# Patient Record
Sex: FEMALE | Race: OTHER | Employment: UNEMPLOYED | URBAN - METROPOLITAN AREA
[De-identification: names, ages, dates, MRNs, and addresses within clinical notes are randomized per-mention and may not be internally consistent; named-entity substitution may affect disease eponyms.]

---

## 2024-02-19 ENCOUNTER — OFFICE VISIT (OUTPATIENT)
Dept: FAMILY MEDICINE CLINIC | Facility: CLINIC | Age: 29
End: 2024-02-19
Payer: COMMERCIAL

## 2024-02-19 VITALS
BODY MASS INDEX: 21.07 KG/M2 | RESPIRATION RATE: 16 BRPM | OXYGEN SATURATION: 100 % | HEIGHT: 62 IN | HEART RATE: 84 BPM | WEIGHT: 114.5 LBS | TEMPERATURE: 97.7 F | DIASTOLIC BLOOD PRESSURE: 64 MMHG | SYSTOLIC BLOOD PRESSURE: 110 MMHG

## 2024-02-19 DIAGNOSIS — Z86.39 HISTORY OF DIABETIC GASTROPARESIS: ICD-10-CM

## 2024-02-19 DIAGNOSIS — F31.9 BIPOLAR 1 DISORDER (HCC): ICD-10-CM

## 2024-02-19 DIAGNOSIS — F43.10 PTSD (POST-TRAUMATIC STRESS DISORDER): ICD-10-CM

## 2024-02-19 DIAGNOSIS — E10.9 CONTROLLED TYPE 1 DIABETES MELLITUS WITHOUT COMPLICATION (HCC): Primary | ICD-10-CM

## 2024-02-19 DIAGNOSIS — Z96.41 INSULIN PUMP IN PLACE: Chronic | ICD-10-CM

## 2024-02-19 DIAGNOSIS — E03.9 HYPOTHYROIDISM, UNSPECIFIED TYPE: ICD-10-CM

## 2024-02-19 DIAGNOSIS — R55 SYNCOPE, UNSPECIFIED SYNCOPE TYPE: ICD-10-CM

## 2024-02-19 DIAGNOSIS — F60.3 BORDERLINE PERSONALITY DISORDER (HCC): ICD-10-CM

## 2024-02-19 PROBLEM — F90.0 ATTENTION DEFICIT HYPERACTIVITY DISORDER (ADHD), PREDOMINANTLY INATTENTIVE TYPE: Status: ACTIVE | Noted: 2017-04-16

## 2024-02-19 PROBLEM — E55.9 VITAMIN D DEFICIENCY: Status: ACTIVE | Noted: 2017-03-20

## 2024-02-19 PROBLEM — Z82.49 FAMILY HISTORY OF PREMATURE CORONARY HEART DISEASE: Status: ACTIVE | Noted: 2019-06-02

## 2024-02-19 PROBLEM — Z86.59 HISTORY OF SUICIDAL IDEATION: Status: RESOLVED | Noted: 2019-06-02 | Resolved: 2024-02-19

## 2024-02-19 PROBLEM — G89.4 CHRONIC PAIN DISORDER: Status: ACTIVE | Noted: 2020-09-30

## 2024-02-19 PROBLEM — Z30.430 ENCOUNTER FOR IUD INSERTION: Status: ACTIVE | Noted: 2022-10-26

## 2024-02-19 PROBLEM — E78.5 DYSLIPIDEMIA: Status: ACTIVE | Noted: 2022-05-25

## 2024-02-19 PROBLEM — N93.9 ABNORMAL UTERINE BLEEDING (AUB): Status: ACTIVE | Noted: 2022-10-26

## 2024-02-19 PROBLEM — F32.9 MAJOR DEPRESSIVE DISORDER: Status: ACTIVE | Noted: 2019-03-31

## 2024-02-19 PROBLEM — R59.0 LYMPHADENOPATHY, CERVICAL: Status: ACTIVE | Noted: 2023-12-19

## 2024-02-19 PROCEDURE — 1036F TOBACCO NON-USER: CPT | Performed by: NURSE PRACTITIONER

## 2024-02-19 PROCEDURE — G8484 FLU IMMUNIZE NO ADMIN: HCPCS | Performed by: NURSE PRACTITIONER

## 2024-02-19 PROCEDURE — G8427 DOCREV CUR MEDS BY ELIG CLIN: HCPCS | Performed by: NURSE PRACTITIONER

## 2024-02-19 PROCEDURE — 99204 OFFICE O/P NEW MOD 45 MIN: CPT | Performed by: NURSE PRACTITIONER

## 2024-02-19 PROCEDURE — 2022F DILAT RTA XM EVC RTNOPTHY: CPT | Performed by: NURSE PRACTITIONER

## 2024-02-19 PROCEDURE — 3046F HEMOGLOBIN A1C LEVEL >9.0%: CPT | Performed by: NURSE PRACTITIONER

## 2024-02-19 PROCEDURE — G8420 CALC BMI NORM PARAMETERS: HCPCS | Performed by: NURSE PRACTITIONER

## 2024-02-19 RX ORDER — LEVONORGESTREL 52 MG/1
1 INTRAUTERINE DEVICE INTRAUTERINE
COMMUNITY

## 2024-02-19 RX ORDER — TRAZODONE HYDROCHLORIDE 50 MG/1
50 TABLET ORAL
COMMUNITY
Start: 2023-07-11

## 2024-02-19 RX ORDER — LAMOTRIGINE 100 MG/1
100 TABLET ORAL DAILY
COMMUNITY
Start: 2023-07-11 | End: 2024-02-19 | Stop reason: SDUPTHER

## 2024-02-19 RX ORDER — PROCHLORPERAZINE 25 MG/1
SUPPOSITORY RECTAL
COMMUNITY
Start: 2023-07-28

## 2024-02-19 RX ORDER — ATOMOXETINE 40 MG/1
40 CAPSULE ORAL DAILY
COMMUNITY
Start: 2023-07-11 | End: 2024-07-10

## 2024-02-19 RX ORDER — ROSUVASTATIN CALCIUM 5 MG/1
5 TABLET, COATED ORAL
COMMUNITY
Start: 2023-09-01

## 2024-02-19 RX ORDER — ESOMEPRAZOLE MAGNESIUM 40 MG/1
CAPSULE, DELAYED RELEASE ORAL
COMMUNITY
Start: 2023-11-28

## 2024-02-19 RX ORDER — LAMOTRIGINE 100 MG/1
100 TABLET ORAL DAILY
Qty: 30 TABLET | Refills: 0 | Status: SHIPPED | OUTPATIENT
Start: 2024-02-19

## 2024-02-19 RX ORDER — IBUPROFEN 200 MG
200 TABLET ORAL EVERY 6 HOURS PRN
COMMUNITY
Start: 2024-02-08 | End: 2024-03-09

## 2024-02-19 RX ORDER — RIZATRIPTAN BENZOATE 10 MG/1
10 TABLET ORAL
COMMUNITY
Start: 2023-08-23 | End: 2024-02-22

## 2024-02-19 RX ORDER — INSULIN DEGLUDEC INJECTION 100 U/ML
INJECTION, SOLUTION SUBCUTANEOUS
COMMUNITY
Start: 2024-01-04

## 2024-02-19 RX ORDER — LEVOTHYROXINE SODIUM 0.03 MG/1
25 TABLET ORAL DAILY
COMMUNITY
Start: 2024-01-04 | End: 2025-01-03

## 2024-02-19 RX ORDER — INSULIN ASPART 100 [IU]/ML
INJECTION, SOLUTION INTRAVENOUS; SUBCUTANEOUS
COMMUNITY
Start: 2024-01-04

## 2024-02-19 RX ORDER — PROCHLORPERAZINE 25 MG/1
SUPPOSITORY RECTAL
COMMUNITY
Start: 2023-12-18

## 2024-02-19 RX ORDER — CEFADROXIL 500 MG/1
CAPSULE ORAL
COMMUNITY
Start: 2024-02-08 | End: 2024-02-19

## 2024-02-19 RX ORDER — GLUCAGON 3 MG/1
POWDER NASAL
COMMUNITY
Start: 2021-12-16

## 2024-02-19 SDOH — ECONOMIC STABILITY: INCOME INSECURITY: HOW HARD IS IT FOR YOU TO PAY FOR THE VERY BASICS LIKE FOOD, HOUSING, MEDICAL CARE, AND HEATING?: NOT HARD AT ALL

## 2024-02-19 SDOH — ECONOMIC STABILITY: HOUSING INSECURITY
IN THE LAST 12 MONTHS, WAS THERE A TIME WHEN YOU DID NOT HAVE A STEADY PLACE TO SLEEP OR SLEPT IN A SHELTER (INCLUDING NOW)?: NO

## 2024-02-19 SDOH — ECONOMIC STABILITY: FOOD INSECURITY: WITHIN THE PAST 12 MONTHS, THE FOOD YOU BOUGHT JUST DIDN'T LAST AND YOU DIDN'T HAVE MONEY TO GET MORE.: NEVER TRUE

## 2024-02-19 SDOH — ECONOMIC STABILITY: FOOD INSECURITY: WITHIN THE PAST 12 MONTHS, YOU WORRIED THAT YOUR FOOD WOULD RUN OUT BEFORE YOU GOT MONEY TO BUY MORE.: NEVER TRUE

## 2024-02-19 ASSESSMENT — PATIENT HEALTH QUESTIONNAIRE - PHQ9
SUM OF ALL RESPONSES TO PHQ9 QUESTIONS 1 & 2: 0
SUM OF ALL RESPONSES TO PHQ QUESTIONS 1-9: 0
SUM OF ALL RESPONSES TO PHQ QUESTIONS 1-9: 0
2. FEELING DOWN, DEPRESSED OR HOPELESS: 0
1. LITTLE INTEREST OR PLEASURE IN DOING THINGS: 0
SUM OF ALL RESPONSES TO PHQ QUESTIONS 1-9: 0
SUM OF ALL RESPONSES TO PHQ QUESTIONS 1-9: 0

## 2024-02-19 NOTE — PROGRESS NOTES
Negin Pham (: 1995) presents today to establish care and for other conditions. Was seeing giancarlo and now needs to transfer due to insurance issues. Was seeing cardiology, rheumatology, endocrinology, neurology, psychiatry, and counselor. Has seen hand specialist in the past for carpel tunnel but does not need referral at this time for them.       She has history of borderline personality disorder, type 1 diabetes, gastroparesis, migraines, joint pain, and syncope.    Psychiatry- treating borderline personality, PTSD, bipolar 2 disorder. On straterra 40 mg-ADHD, Lamictal 100 mg (needs refills of this today, 50 mg trazodone for insomnia.    Cardiology/neurology- during pregnancy started passing out. Has had bradycardia, tachycardia. Takes maxalt for migraine treatment. Having more than 12 migraines a month.     Type 1 diabetes, thyroid disorder- well controlled now according to patient. Dx at age 5. Has DEXCOM 6 and insulin pump. On 40 mg nexium for gastroparesis- had gastric emptying study done in the past. On novolag, tresiba. On 25 mcg synthroid.     Cholesterol- been elevated since 2017- on crestor 5 mg.     Rheumatology- joint pain since she was 12 years old. Has never been diagnosed with anything in the past. Found a visit from - they thought possibly fibromyalgia and recommended naproxen. Arthritic panel appeared negative at that time.     Chief Complaint   Patient presents with    New Patient     Establish care and discuss referrals and health issues      Patient Active Problem List   Diagnosis    Vitamin D deficiency    Vitamin B12 deficiency    Syncope    Lymphocytic thyroiditis    Lymphadenopathy, cervical    Iron deficiency anemia    Insulin pump in place    Hypothyroidism    Family history of premature coronary heart disease    Encounter for IUD insertion    Dyslipidemia    Major depressive disorder

## 2024-02-20 PROBLEM — F31.9 BIPOLAR 1 DISORDER (HCC): Status: ACTIVE | Noted: 2024-02-20

## 2024-02-20 PROBLEM — F43.10 PTSD (POST-TRAUMATIC STRESS DISORDER): Status: ACTIVE | Noted: 2024-02-20

## 2024-02-22 ENCOUNTER — OFFICE VISIT (OUTPATIENT)
Dept: NEUROLOGY | Age: 29
End: 2024-02-22

## 2024-02-22 VITALS
WEIGHT: 113.6 LBS | BODY MASS INDEX: 20.91 KG/M2 | HEIGHT: 62 IN | OXYGEN SATURATION: 98 % | SYSTOLIC BLOOD PRESSURE: 122 MMHG | HEART RATE: 105 BPM | DIASTOLIC BLOOD PRESSURE: 89 MMHG

## 2024-02-22 DIAGNOSIS — G90.9 AUTONOMIC DYSFUNCTION: ICD-10-CM

## 2024-02-22 DIAGNOSIS — G43.109 MIGRAINE WITH AURA AND WITHOUT STATUS MIGRAINOSUS, NOT INTRACTABLE: ICD-10-CM

## 2024-02-22 DIAGNOSIS — E10.9 CONTROLLED TYPE 1 DIABETES MELLITUS WITHOUT COMPLICATION (HCC): ICD-10-CM

## 2024-02-22 DIAGNOSIS — R55 SYNCOPE AND COLLAPSE: Primary | ICD-10-CM

## 2024-02-22 RX ORDER — UBROGEPANT 100 MG/1
100 TABLET ORAL DAILY PRN
Qty: 16 TABLET | Refills: 0 | Status: SHIPPED | OUTPATIENT
Start: 2024-02-22

## 2024-02-22 ASSESSMENT — ENCOUNTER SYMPTOMS
EYE PAIN: 0
PHOTOPHOBIA: 1
RESPIRATORY NEGATIVE: 1
NAUSEA: 1

## 2024-02-22 NOTE — PATIENT INSTRUCTIONS
Headache Education:   Instructed the patient on over-the-counter headache management medications including: CoQ10, magnesium oxide, and butterbur.  To avoid a pain medication overuse headache trying not to take pain medicines more than 3 doses a week.   Avoid use of Fioricet or opioids to treat headaches as this can increase risk for rebound headaches.   To help relieve headache symptoms without taking pain medicine lie down under darkroom and drink glass of water.  Consider lifestyle modification including good sleep hygiene, routine medial schedules, regular exercise and managing triggers.  Keep a headache diary  to reveal triggers and possible patterns.  Triggers may be: Food, stress, perfumes, alcohol, or even chocolate.  Drink plenty of water and try to get 8 hours of sleep each night to reduce risk factors that may cause headaches.      Samples Ubrelvy 100 mg provided to patient. Advised to update office on efficacy.   Instructions:  Qulipta 60 mg daily for migraine prevention.   Ubrelvy 100 mg daily as needed for migraine abortive therapy. May repeat for 1 dose in 2 hours if needed. Not to exceed 200mg/24 hours.

## 2024-02-22 NOTE — PROGRESS NOTES
reflexes present. Sensation is intact to light touch, pinprick, vibration and proprioception in all extremities. Cerebellar examination is normal. Gait and stance are normal.     Most recent EEG  - I personally reviewed this image   EEG REPORT     Date of service: 4/18/23     Centrally-acting medications: Strattera     Indication: syncope     Technique: This EEG was performed on a 28-year-old patient with digital equipment utilizing 26 active electrodes placed according to the international 10-20 system. The study was recorded digitally with a bandpass of 1-70 Hz and a sampling rate of 200 Hz and was reviewed with the possibility of multiple reformatting.  An EKG was monitored. The length of the recording was 30 minutes.     The patient was noted to be awake, drowsy and asleep throughout the recording. During maximal wakefulness, a posterior dominant background rhythm of 10 Hz was present which was well modulated, symmetrical, reactive to eye opening and of moderate voltage. There were faster frequencies present in the bilateral anterior head regions. Drowsiness was present with attenuation of the posterior dominant background rhythm and vertex waves. Stage N2 sleep was present with symmetrical sleep spindles, K complexes and vertex waves.     Photic stimulation was performed which induced a symmetrical posterior driving response at various flash frequencies. Hyperventilation was performed and produced a mild amount of diffuse slowing.     Single lead EKG revealed a normal sinus rhythm.     Impression:   Normal awake and asleep EEG.     Clinical interpretation:   No epileptiform activity or focal regions of cerebral dysfunction were present. A normal EEG does not rule out epilepsy, clinical correlation is recommended.       Most recent MRI - I personally reviewed this image   MRI BRAIN WITHOUT CONTRAST     INDICATION: 28-year-old with headache and syncope.     COMPARISON:     TECHNIQUE: Multiplanar, multisequence MR

## 2024-02-26 ENCOUNTER — TELEPHONE (OUTPATIENT)
Dept: NEUROLOGY | Age: 29
End: 2024-02-26

## 2024-02-26 NOTE — TELEPHONE ENCOUNTER
Approved for Ubrelvy 100mg from 2/26/24 yo 2/25/25. PA code # [id number]    Approved for Qulipta 60mg from 2/26/2024 to 2/25/2025  PA # [id number]

## 2024-03-05 ENCOUNTER — PROCEDURE VISIT (OUTPATIENT)
Dept: NEUROLOGY | Age: 29
End: 2024-03-05
Payer: COMMERCIAL

## 2024-03-05 VITALS — HEIGHT: 62 IN | BODY MASS INDEX: 20.78 KG/M2 | OXYGEN SATURATION: 99 % | HEART RATE: 105 BPM

## 2024-03-05 DIAGNOSIS — R20.2 PARESTHESIA OF BOTH FEET: Primary | ICD-10-CM

## 2024-03-05 PROCEDURE — 95885 MUSC TST DONE W/NERV TST LIM: CPT | Performed by: PSYCHIATRY & NEUROLOGY

## 2024-03-05 PROCEDURE — 95911 NRV CNDJ TEST 9-10 STUDIES: CPT | Performed by: PSYCHIATRY & NEUROLOGY

## 2024-03-05 NOTE — PROGRESS NOTES
EMG/Nerve Conduction Study Procedure Note  [address]    [address]  [place]  [address]   [phone]      Hx:    Exam:     28 y.o.  M W  female right-handed accompanied by her infant/2-year-old.  DM type I diabetic.  Paresthesia of feet.  BLE.  EMG.  Referring: Erlinda Parikh CNP  PCP Di Jones CNP  Technologist: Dc Lee  Height: 5 foot 2 inch        Summary               selected muscles tested bilateral lower extremities EMG with CV.       Controlled environmental factors / EMG lab.  Temperature.   NCV : sensory segments:    Normal bilateral sural SCV.  NCV plantar sensory segments:     Normal bilateral plantar SCV.  NCV Motor MCV segments:     Normal bilateral peroneal and tibial MCV.  F-wave studies:         Normal bilateral peroneal bilateral tibial F waves.  H-REFLEX Studies:    None normal bilateral H-reflexes.   NEEDLE EMG:   Tested muscles::   Normal bilateral TA MG VL muscles lower extremities =  Normal insertional activity and interference pattern/recruitment.  No fasciculations fibrillations positive sharp waves.  Normal MUP.  No BSS AP.  No giant MUP.  No myotonia.  No upper motor neuron sign.          INTERPRETATION:             INTERPRETATION:       NORMAL STUDIES.     THERE IS NO ELECTROPHYSIOLOGIC EVIDENCE OF NEUROPATHY, DENERVATION, RADICULOPATHY, PLEXOPATHY, MYOPATHY, MYOTONIA, OR FASCICULATIONS IN THE TESTED SEGMENTS  OF  BOTH LOWER    EXTREMITIES.            CONCLUSION:      NORMAL BILATERAL LOWER STUDIES.        Procedure Details:       FURTHER clinical correlation is recommended and warranted as studies are within normal limits.                                                             Patient is made aware.                    Please Note::     Data and waveforms * filed under Procedure category [facility].    See Procedure Files for complete data pages.       [ *NOTE:  parts or all of this consultation are produced using artificial voice recognition software.  Some

## 2024-03-08 ENCOUNTER — PROCEDURE VISIT (OUTPATIENT)
Dept: NEUROLOGY | Age: 29
End: 2024-03-08
Payer: COMMERCIAL

## 2024-03-08 DIAGNOSIS — R55 SYNCOPE, UNSPECIFIED SYNCOPE TYPE: Primary | ICD-10-CM

## 2024-03-08 PROCEDURE — 95921 AUTONOMIC NRV PARASYM INERVJ: CPT | Performed by: PSYCHIATRY & NEUROLOGY

## 2024-03-08 NOTE — PROGRESS NOTES
Negin Pham  :  1995  DOS:  3/8/2024             AUTONOMIC TESTING::  Syncopal episodes.  Loss of consciousness/fainting.  Referring:   Erlinda CAICEDO       R-R INTERVAL::       Ratio         R-R Melinda:         HR  Variance:               RESTING::      1.53         10.4     10.7       6 BREATHS =      7.53         39.4      48.1       SUPINE =    BP =    148/94         HR =    66       SITTING =   BP =     125/95       HR =      69   STANDING =  BP =   114/93 *     HR =      91*      SYMPATHETIC SKIN RESPONSE::    PALM:   0.61         FOOT:     0.78                 CONCLUSION::           A significant drop in the blood pressures is detected on standing versus supine = thus orthostatic hypotension is present.  However, basic autonomic pathways by testing are normal.        [ Parts of this report may have been dictated using artificial dictation with its inherent possibilities of errors.]              Gabino Rdz MD  Consultative Neurology, [place] [address]  Phone:  [phone]  Fax:   [phone]

## 2024-03-12 ENCOUNTER — OFFICE VISIT (OUTPATIENT)
Age: 29
End: 2024-03-12
Payer: COMMERCIAL

## 2024-03-12 VITALS
BODY MASS INDEX: 20.28 KG/M2 | HEART RATE: 94 BPM | RESPIRATION RATE: 16 BRPM | OXYGEN SATURATION: 99 % | HEIGHT: 62 IN | SYSTOLIC BLOOD PRESSURE: 110 MMHG | WEIGHT: 110.2 LBS | DIASTOLIC BLOOD PRESSURE: 70 MMHG

## 2024-03-12 DIAGNOSIS — R19.5 MUCUS IN STOOL: ICD-10-CM

## 2024-03-12 DIAGNOSIS — R10.13 EPIGASTRIC PAIN: ICD-10-CM

## 2024-03-12 DIAGNOSIS — K30 DELAYED GASTRIC EMPTYING: Primary | ICD-10-CM

## 2024-03-12 DIAGNOSIS — R19.4 BOWEL HABIT CHANGES: ICD-10-CM

## 2024-03-12 DIAGNOSIS — R11.0 CHRONIC NAUSEA: ICD-10-CM

## 2024-03-12 PROCEDURE — G8484 FLU IMMUNIZE NO ADMIN: HCPCS | Performed by: PHYSICIAN ASSISTANT

## 2024-03-12 PROCEDURE — G8420 CALC BMI NORM PARAMETERS: HCPCS | Performed by: PHYSICIAN ASSISTANT

## 2024-03-12 PROCEDURE — G8427 DOCREV CUR MEDS BY ELIG CLIN: HCPCS | Performed by: PHYSICIAN ASSISTANT

## 2024-03-12 PROCEDURE — 99204 OFFICE O/P NEW MOD 45 MIN: CPT | Performed by: PHYSICIAN ASSISTANT

## 2024-03-12 PROCEDURE — 1036F TOBACCO NON-USER: CPT | Performed by: PHYSICIAN ASSISTANT

## 2024-03-12 RX ORDER — ONDANSETRON 4 MG/1
4 TABLET, FILM COATED ORAL DAILY PRN
Qty: 30 TABLET | Refills: 1 | Status: SHIPPED | OUTPATIENT
Start: 2024-03-12

## 2024-03-12 NOTE — PATIENT INSTRUCTIONS
For reflux:   Eat smaller and more frequent meals. Avoid lying down for 3 hours after eating. Elevate the head of the bed by 6 inches. Avoid wearing tight-fitting clothing. Stop smoking and avoid alcohol. Avoid NSAID medications (Aspirin, Excedrin, Aleve, Ibuprofen, Goody Powder, Toradol, Mobic, Voltaren, etc). Consider weight loss to get to a healthy weight, which is often critical to eliminating symptoms of reflux. Avoid foods and acid-containing beverages that can exacerbate the symptoms of reflux. This includes:     -Caffeine  -Chocolate  -Peppermint  -Alcohol (especially red wine)  -Carbonated beverages  -Citrus fruits  -Tomato-based products  -Vinegar  -Spicy and greasy foods

## 2024-03-13 ENCOUNTER — PREP FOR PROCEDURE (OUTPATIENT)
Dept: GASTROENTEROLOGY | Age: 29
End: 2024-03-13

## 2024-03-13 DIAGNOSIS — K30 DELAYED GASTRIC EMPTYING: ICD-10-CM

## 2024-03-13 DIAGNOSIS — R19.4 BOWEL HABIT CHANGES: ICD-10-CM

## 2024-03-13 DIAGNOSIS — R10.13 EPIGASTRIC PAIN: ICD-10-CM

## 2024-03-13 DIAGNOSIS — R11.0 CHRONIC NAUSEA: ICD-10-CM

## 2024-03-13 DIAGNOSIS — R19.5 MUCUS IN STOOL: ICD-10-CM

## 2024-03-13 RX ORDER — SODIUM CHLORIDE 0.9 % (FLUSH) 0.9 %
5-40 SYRINGE (ML) INJECTION EVERY 12 HOURS SCHEDULED
Status: CANCELLED | OUTPATIENT
Start: 2024-03-13

## 2024-03-13 RX ORDER — SODIUM CHLORIDE 9 MG/ML
25 INJECTION, SOLUTION INTRAVENOUS PRN
Status: CANCELLED | OUTPATIENT
Start: 2024-03-13

## 2024-03-13 RX ORDER — SODIUM CHLORIDE 0.9 % (FLUSH) 0.9 %
5-40 SYRINGE (ML) INJECTION PRN
Status: CANCELLED | OUTPATIENT
Start: 2024-03-13

## 2024-03-14 DIAGNOSIS — G43.109 MIGRAINE WITH AURA AND WITHOUT STATUS MIGRAINOSUS, NOT INTRACTABLE: ICD-10-CM

## 2024-03-14 RX ORDER — UBROGEPANT 100 MG/1
TABLET ORAL
Qty: 16 TABLET | Refills: 0 | OUTPATIENT
Start: 2024-03-14

## 2024-03-28 ENCOUNTER — INITIAL CONSULT (OUTPATIENT)
Age: 29
End: 2024-03-28
Payer: COMMERCIAL

## 2024-03-28 VITALS
WEIGHT: 108.2 LBS | SYSTOLIC BLOOD PRESSURE: 120 MMHG | DIASTOLIC BLOOD PRESSURE: 78 MMHG | HEIGHT: 62 IN | HEART RATE: 84 BPM | BODY MASS INDEX: 19.91 KG/M2

## 2024-03-28 DIAGNOSIS — Z82.49 FAMILY HISTORY OF CARDIOMYOPATHY: ICD-10-CM

## 2024-03-28 DIAGNOSIS — R00.0 TACHYCARDIA: ICD-10-CM

## 2024-03-28 DIAGNOSIS — E78.2 MIXED HYPERLIPIDEMIA: ICD-10-CM

## 2024-03-28 DIAGNOSIS — R07.2 PRECORDIAL CHEST PAIN: ICD-10-CM

## 2024-03-28 DIAGNOSIS — Z87.898 HISTORY OF SYNCOPE: ICD-10-CM

## 2024-03-28 DIAGNOSIS — I95.1 ORTHOSTATIC HYPOTENSION: Primary | ICD-10-CM

## 2024-03-28 PROCEDURE — 99204 OFFICE O/P NEW MOD 45 MIN: CPT | Performed by: INTERNAL MEDICINE

## 2024-03-28 PROCEDURE — 93000 ELECTROCARDIOGRAM COMPLETE: CPT | Performed by: INTERNAL MEDICINE

## 2024-03-28 PROCEDURE — G8420 CALC BMI NORM PARAMETERS: HCPCS | Performed by: INTERNAL MEDICINE

## 2024-03-28 PROCEDURE — G8427 DOCREV CUR MEDS BY ELIG CLIN: HCPCS | Performed by: INTERNAL MEDICINE

## 2024-03-28 PROCEDURE — G8484 FLU IMMUNIZE NO ADMIN: HCPCS | Performed by: INTERNAL MEDICINE

## 2024-03-28 ASSESSMENT — ENCOUNTER SYMPTOMS
HEMOPTYSIS: 0
HEMATOCHEZIA: 0
DOUBLE VISION: 0
ABDOMINAL PAIN: 0
HOARSE VOICE: 0
HEMATEMESIS: 0
WHEEZING: 0
STRIDOR: 0
EYE REDNESS: 0

## 2024-03-28 NOTE — PROGRESS NOTES
[facility] CARDIOLOGY  [address] [place] [address], [address]  [place] [address]  PHONE: [phone]          24    NAME:  Negin Pham  : 1995  MRN: [id number]         SUBJECTIVE:   Negin Pham is a 29 y.o. female seen for a visit regarding the following:     Chief Complaint   Patient presents with   • Consultation     History of syncope  Orthostatic hypotension  Mixed hyperlipidemia  Family history of premature coronary heart disease               HPI:    1.  Syncope/orthostatic hypotension  Holter 2023-48-hour-sinus rhythm-average heart rate 79 bpm, rare PACs and PVCs  Echo from 20222724-Oxsqjl-VH 55 to 60%, otherwise normal  2.  Family history of coronary artery disease/cardiomyopathy-father  at the age of 28  Treadmill exercise stress test 2022-no ischemia-hit stage IV-achieved 12.5 METS  3.  Mixed hyperlipidemia  4.  Type 1 diabetes- diagnosed at 5  5.  Chest pain    Dear Di,  I saw Ms. Pham who is a pleasant 29-year-old woman in cardiovascular consultation for recurrent syncope with orthostatic hypotension, family history of coronary artery disease with known underlying hyperlipidemia and type 1 diabetes mellitus.    Syncope/orthostatic hypotension-has dealt with this for a while.  Has had multiple episodes of passing out completely but almost always happens when she stands from a seated position.  She has been wearing compression stockings, has been liberalizing salt and fluid intake which has helped lately.  Has somewhat of neuropathy associated with her type 1 diabetes that she has had for over 20 years.  No associated palpitations although at times she has some of it.  It is not always associated with syncope.    Tachycardia/bradycardia-quite often has episodes of heart rate reaching over 200 bpm.  She says after that it drops quite often only into the 40s and she gets lightheaded and dizzy.  She has been on Holter monitor but a very short 1 last year for just 48

## 2024-04-01 ENCOUNTER — OFFICE VISIT (OUTPATIENT)
Dept: FAMILY MEDICINE CLINIC | Facility: CLINIC | Age: 29
End: 2024-04-01
Payer: COMMERCIAL

## 2024-04-01 VITALS
RESPIRATION RATE: 16 BRPM | BODY MASS INDEX: 19.54 KG/M2 | OXYGEN SATURATION: 98 % | SYSTOLIC BLOOD PRESSURE: 90 MMHG | TEMPERATURE: 98.4 F | WEIGHT: 106.2 LBS | HEIGHT: 62 IN | HEART RATE: 85 BPM | DIASTOLIC BLOOD PRESSURE: 60 MMHG

## 2024-04-01 DIAGNOSIS — Z86.19 HISTORY OF HPV INFECTION: ICD-10-CM

## 2024-04-01 DIAGNOSIS — Z00.00 ROUTINE GENERAL MEDICAL EXAMINATION AT A HEALTH CARE FACILITY: Primary | ICD-10-CM

## 2024-04-01 DIAGNOSIS — F60.3 BORDERLINE PERSONALITY DISORDER (HCC): ICD-10-CM

## 2024-04-01 DIAGNOSIS — Z00.00 ROUTINE GENERAL MEDICAL EXAMINATION AT A HEALTH CARE FACILITY: ICD-10-CM

## 2024-04-01 DIAGNOSIS — Z12.4 SCREENING FOR CERVICAL CANCER: ICD-10-CM

## 2024-04-01 DIAGNOSIS — N93.9 ABNORMAL UTERINE BLEEDING (AUB): ICD-10-CM

## 2024-04-01 LAB
ALBUMIN SERPL-MCNC: 3.7 G/DL (ref 3.5–5)
ALBUMIN/GLOB SERPL: 1 (ref 0.4–1.6)
ALP SERPL-CCNC: 64 U/L (ref 50–136)
ALT SERPL-CCNC: 16 U/L (ref 12–65)
ANION GAP SERPL CALC-SCNC: 4 MMOL/L (ref 2–11)
AST SERPL-CCNC: 17 U/L (ref 15–37)
BASOPHILS # BLD: 0.1 K/UL (ref 0–0.2)
BASOPHILS NFR BLD: 1 % (ref 0–2)
BILIRUB SERPL-MCNC: 0.5 MG/DL (ref 0.2–1.1)
BUN SERPL-MCNC: 9 MG/DL (ref 6–23)
CALCIUM SERPL-MCNC: 9.5 MG/DL (ref 8.3–10.4)
CHLORIDE SERPL-SCNC: 107 MMOL/L (ref 103–113)
CHOLEST SERPL-MCNC: 136 MG/DL
CO2 SERPL-SCNC: 24 MMOL/L (ref 21–32)
CREAT SERPL-MCNC: 0.9 MG/DL (ref 0.6–1)
DIFFERENTIAL METHOD BLD: ABNORMAL
EOSINOPHIL # BLD: 0.1 K/UL (ref 0–0.8)
EOSINOPHIL NFR BLD: 1 % (ref 0.5–7.8)
ERYTHROCYTE [DISTWIDTH] IN BLOOD BY AUTOMATED COUNT: 12.8 % (ref 11.9–14.6)
GLOBULIN SER CALC-MCNC: 3.7 G/DL (ref 2.8–4.5)
GLUCOSE SERPL-MCNC: 297 MG/DL (ref 65–100)
HCT VFR BLD AUTO: 41.4 % (ref 35.8–46.3)
HDLC SERPL-MCNC: 46 MG/DL (ref 40–60)
HDLC SERPL: 3
HGB BLD-MCNC: 13.1 G/DL (ref 11.7–15.4)
IMM GRANULOCYTES # BLD AUTO: 0 K/UL (ref 0–0.5)
IMM GRANULOCYTES NFR BLD AUTO: 0 % (ref 0–5)
LDLC SERPL CALC-MCNC: 79.6 MG/DL
LYMPHOCYTES # BLD: 2.3 K/UL (ref 0.5–4.6)
LYMPHOCYTES NFR BLD: 29 % (ref 13–44)
MCH RBC QN AUTO: 28.5 PG (ref 26.1–32.9)
MCHC RBC AUTO-ENTMCNC: 31.6 G/DL (ref 31.4–35)
MCV RBC AUTO: 90 FL (ref 82–102)
MONOCYTES # BLD: 0.5 K/UL (ref 0.1–1.3)
MONOCYTES NFR BLD: 7 % (ref 4–12)
NEUTS SEG # BLD: 4.8 K/UL (ref 1.7–8.2)
NEUTS SEG NFR BLD: 62 % (ref 43–78)
NRBC # BLD: 0 K/UL (ref 0–0.2)
PLATELET # BLD AUTO: 504 K/UL (ref 150–450)
PMV BLD AUTO: 10.6 FL (ref 9.4–12.3)
POTASSIUM SERPL-SCNC: 4.8 MMOL/L (ref 3.5–5.1)
PROT SERPL-MCNC: 7.4 G/DL (ref 6.3–8.2)
RBC # BLD AUTO: 4.6 M/UL (ref 4.05–5.2)
SODIUM SERPL-SCNC: 135 MMOL/L (ref 136–146)
TRIGL SERPL-MCNC: 52 MG/DL (ref 35–150)
TSH, 3RD GENERATION: 3.39 UIU/ML (ref 0.36–3.74)
VLDLC SERPL CALC-MCNC: 10.4 MG/DL (ref 6–23)
WBC # BLD AUTO: 7.7 K/UL (ref 4.3–11.1)

## 2024-04-01 PROCEDURE — G8420 CALC BMI NORM PARAMETERS: HCPCS | Performed by: NURSE PRACTITIONER

## 2024-04-01 PROCEDURE — 1036F TOBACCO NON-USER: CPT | Performed by: NURSE PRACTITIONER

## 2024-04-01 PROCEDURE — 99213 OFFICE O/P EST LOW 20 MIN: CPT | Performed by: NURSE PRACTITIONER

## 2024-04-01 PROCEDURE — 99395 PREV VISIT EST AGE 18-39: CPT | Performed by: NURSE PRACTITIONER

## 2024-04-01 PROCEDURE — G8427 DOCREV CUR MEDS BY ELIG CLIN: HCPCS | Performed by: NURSE PRACTITIONER

## 2024-04-01 SDOH — ECONOMIC STABILITY: FOOD INSECURITY: WITHIN THE PAST 12 MONTHS, THE FOOD YOU BOUGHT JUST DIDN'T LAST AND YOU DIDN'T HAVE MONEY TO GET MORE.: NEVER TRUE

## 2024-04-01 SDOH — ECONOMIC STABILITY: FOOD INSECURITY: WITHIN THE PAST 12 MONTHS, YOU WORRIED THAT YOUR FOOD WOULD RUN OUT BEFORE YOU GOT MONEY TO BUY MORE.: NEVER TRUE

## 2024-04-01 SDOH — ECONOMIC STABILITY: INCOME INSECURITY: HOW HARD IS IT FOR YOU TO PAY FOR THE VERY BASICS LIKE FOOD, HOUSING, MEDICAL CARE, AND HEATING?: NOT HARD AT ALL

## 2024-04-01 ASSESSMENT — PATIENT HEALTH QUESTIONNAIRE - PHQ9
SUM OF ALL RESPONSES TO PHQ9 QUESTIONS 1 & 2: 0
2. FEELING DOWN, DEPRESSED OR HOPELESS: NOT AT ALL
SUM OF ALL RESPONSES TO PHQ QUESTIONS 1-9: 0
1. LITTLE INTEREST OR PLEASURE IN DOING THINGS: NOT AT ALL

## 2024-04-01 NOTE — PROGRESS NOTES
Negin Pham (: 1995) presents today for physical and follow up.     She has history of borderline personality disorder, type 1 diabetes, gastroparesis, migraines, joint pain, and syncope.     Seeing GI, cardiology, neurology, and endocrinology. Still waiting on apt with psychiatry.     Cervical cancer screening- she is unsure of LMP due to having IUD mirena- does not have regular cycles with this. Long history of heavy cycles and irregular cycles. She has history of HPV as well. Would like to see gynecology to establish care.     Chief Complaint   Patient presents with    Annual Exam     Physical and pap     Patient Active Problem List   Diagnosis    Vitamin D deficiency    Vitamin B12 deficiency    Syncope    Lymphocytic thyroiditis    Lymphadenopathy, cervical    Iron deficiency anemia    Insulin pump in place    Hypothyroidism    Family history of premature coronary heart disease    Encounter for IUD insertion    Dyslipidemia    Major depressive disorder    Depression with anxiety    Controlled type 1 diabetes mellitus without complication (HCC)    Chronic pain disorder    Borderline personality disorder (HCC)    Attention deficit hyperactivity disorder (ADHD), predominantly inattentive type    Abnormal uterine bleeding (AUB)    PTSD (post-traumatic stress disorder)    Bipolar 2 disorder (HCC)    Delayed gastric emptying    Epigastric pain    Chronic nausea    Bowel habit changes    Mucus in stool     Past Medical History:   Diagnosis Date    ADHD (attention deficit hyperactivity disorder), inattentive type     Bipolar 2 disorder (HCC)     Borderline personality disorder (HCC)     Chronic pain syndrome     Diabetes (HCC)     High cholesterol     Hypothyroid     Lymphadenopathy     Orthostatic hypotension     PTSD (post-traumatic stress disorder)     Syncope     unspecified     Past Surgical History:   Procedure Laterality

## 2024-04-11 ENCOUNTER — OFFICE VISIT (OUTPATIENT)
Dept: ENDOCRINOLOGY | Age: 29
End: 2024-04-11
Payer: COMMERCIAL

## 2024-04-11 VITALS
HEART RATE: 74 BPM | DIASTOLIC BLOOD PRESSURE: 60 MMHG | BODY MASS INDEX: 19.39 KG/M2 | WEIGHT: 106 LBS | SYSTOLIC BLOOD PRESSURE: 90 MMHG

## 2024-04-11 DIAGNOSIS — Z96.41 INSULIN PUMP STATUS: ICD-10-CM

## 2024-04-11 DIAGNOSIS — E03.9 PRIMARY HYPOTHYROIDISM: ICD-10-CM

## 2024-04-11 DIAGNOSIS — E10.65 TYPE 1 DIABETES MELLITUS WITH HYPERGLYCEMIA (HCC): Primary | ICD-10-CM

## 2024-04-11 LAB — HBA1C MFR BLD: 7 %

## 2024-04-11 PROCEDURE — G8420 CALC BMI NORM PARAMETERS: HCPCS | Performed by: NURSE PRACTITIONER

## 2024-04-11 PROCEDURE — 3046F HEMOGLOBIN A1C LEVEL >9.0%: CPT | Performed by: NURSE PRACTITIONER

## 2024-04-11 PROCEDURE — 99205 OFFICE O/P NEW HI 60 MIN: CPT | Performed by: NURSE PRACTITIONER

## 2024-04-11 PROCEDURE — 95251 CONT GLUC MNTR ANALYSIS I&R: CPT | Performed by: NURSE PRACTITIONER

## 2024-04-11 PROCEDURE — 1036F TOBACCO NON-USER: CPT | Performed by: NURSE PRACTITIONER

## 2024-04-11 PROCEDURE — 83036 HEMOGLOBIN GLYCOSYLATED A1C: CPT | Performed by: NURSE PRACTITIONER

## 2024-04-11 PROCEDURE — 2022F DILAT RTA XM EVC RTNOPTHY: CPT | Performed by: NURSE PRACTITIONER

## 2024-04-11 PROCEDURE — G8427 DOCREV CUR MEDS BY ELIG CLIN: HCPCS | Performed by: NURSE PRACTITIONER

## 2024-04-11 RX ORDER — NORGESTIMATE AND ETHINYL ESTRADIOL 0.25-0.035
1 KIT ORAL DAILY
COMMUNITY
Start: 2024-03-21

## 2024-04-11 RX ORDER — INSULIN ASPART 100 [IU]/ML
INJECTION, SOLUTION INTRAVENOUS; SUBCUTANEOUS
Qty: 60 ML | Refills: 3 | Status: SHIPPED | OUTPATIENT
Start: 2024-04-11

## 2024-04-11 ASSESSMENT — ENCOUNTER SYMPTOMS
DIARRHEA: 0
CONSTIPATION: 0

## 2024-04-11 NOTE — PROGRESS NOTES
[facility] Endocrinology  [address], [address]  [place] [address]            Negin Pham is a 29 y.o. female seen at the request of [facility] for the evaluation of type 1 diabetes mellitus.      ASSESSMENT AND PLAN:  Interpretation of 72 hour glucose monitor:  At least 72 hours of data were reviewed.  The patient utilizes a Dexcom G6 continuous glucose monitoring system.  The average glucose during the reviewed timeframe was 166 with a coefficient of variation of 52.0 (time in range 68%, time above range 29%, time below range 3%).     1. Type 1 diabetes mellitus with hyperglycemia (HCC)  A1c 7.0%, TIR 68%, GMI: 7.3%. Glycemic control sub optimal.   I did not make any setting changes today. Will review how to do an extended bolus at her next visit. I suspect most of her lows are self-inflicted by bolusing when she is finishing eating due to gastroparesis.   Patient has Baqsimi available for severe hypoglycemic events and is knowledgeable on administration.  Will check autoantibodies as she has been unable to get her children's pediatrician to check her children as they tell her type 1 is not genetic.   Foot exam reveals reduced sensation. Counseled on appropriate foot care including examining their feet daily, avoiding activities that can injure feet, walking barefoot or wearing open toed shoes, and using care when trimming nails.   Patient is overdue for her annual diabetic eye exam. Counseled on optimizing glycemic control, blood pressure and cholesterol to reduce risk or slow progression of diabetic retinopathy. Advised to schedule an appointment with opthalmology.     - AMB POC HEMOGLOBIN A1C  - NOVOLOG 100 UNIT/ML injection vial; For use in insulin pump. Max daily dose: 60 units  Dispense: 60 mL; Refill: 3  - IA-2 Antibody; Future  - Zinc Transporter 8 AB; Future  - Glutamic Acid Decarboxylase; Future  - Insulin Antibody; Future  - Comprehensive

## 2024-04-25 NOTE — PROGRESS NOTES
repeat dose in 15 minutes if patient remains unresponsive.      Insulin Degludec (TRESIBA FLEXTOUCH) 100 UNIT/ML SOPN 30 units every Every 24 hours if pump failure.      levonorgestrel (MIRENA, 52 MG,) IUD 52 mg 1 each by IntraUTERine route      levothyroxine (SYNTHROID) 25 MCG tablet Take 1 tablet by mouth daily      rosuvastatin (CRESTOR) 5 MG tablet Take 1 tablet by mouth      traZODone (DESYREL) 50 MG tablet Take 1 tablet by mouth      lamoTRIgine (LAMICTAL) 100 MG tablet Take 1 tablet by mouth daily 30 tablet 0    norgestimate-ethinyl estradiol (ORTHO-CYCLEN) 0.25-35 MG-MCG per tablet Take 1 tablet by mouth daily (Patient not taking: Reported on 4/26/2024)       No current facility-administered medications for this visit.       Social History:  Social History     Socioeconomic History    Marital status:      Spouse name: Not on file    Number of children: Not on file    Years of education: Not on file    Highest education level: Not on file   Occupational History    Not on file   Tobacco Use    Smoking status: Former     Types: Cigarettes    Smokeless tobacco: Never   Vaping Use    Vaping Use: Never used   Substance and Sexual Activity    Alcohol use: Never    Drug use: Never    Sexual activity: Yes     Partners: Male     Birth control/protection: I.U.D.   Other Topics Concern    Not on file   Social History Narrative    Not on file     Social Determinants of Health     Financial Resource Strain: Low Risk  (4/1/2024)    Overall Financial Resource Strain (CARDIA)     Difficulty of Paying Living Expenses: Not hard at all   Food Insecurity: No Food Insecurity (4/1/2024)    Hunger Vital Sign     Worried About Running Out of Food in the Last Year: Never true     Ran Out of Food in the Last Year: Never true   Transportation Needs: Unknown (4/1/2024)    PRAPARE - Transportation     Lack of Transportation (Medical): Not on file     Lack of Transportation (Non-Medical): No   Physical Activity: Not on file

## 2024-04-26 ENCOUNTER — OFFICE VISIT (OUTPATIENT)
Dept: OBGYN CLINIC | Age: 29
End: 2024-04-26
Payer: COMMERCIAL

## 2024-04-26 VITALS
SYSTOLIC BLOOD PRESSURE: 100 MMHG | HEIGHT: 62 IN | DIASTOLIC BLOOD PRESSURE: 66 MMHG | BODY MASS INDEX: 19.75 KG/M2 | WEIGHT: 107.3 LBS

## 2024-04-26 DIAGNOSIS — Z13.89 SCREENING FOR GENITOURINARY CONDITION: ICD-10-CM

## 2024-04-26 DIAGNOSIS — Z01.419 WELL WOMAN EXAM: ICD-10-CM

## 2024-04-26 DIAGNOSIS — Z11.3 SCREENING FOR STDS (SEXUALLY TRANSMITTED DISEASES): ICD-10-CM

## 2024-04-26 DIAGNOSIS — R10.2 PELVIC PAIN: ICD-10-CM

## 2024-04-26 DIAGNOSIS — Z12.4 PAP SMEAR FOR CERVICAL CANCER SCREENING: Primary | ICD-10-CM

## 2024-04-26 LAB
BILIRUBIN, URINE, POC: NEGATIVE
BLOOD URINE, POC: NEGATIVE
GLUCOSE URINE, POC: NEGATIVE
KETONES, URINE, POC: NEGATIVE
LEUKOCYTE ESTERASE, URINE, POC: NEGATIVE
NITRITE, URINE, POC: NEGATIVE
PH, URINE, POC: 6.5 (ref 4.6–8)
PROTEIN,URINE, POC: NEGATIVE
SPECIFIC GRAVITY, URINE, POC: 1.02 (ref 1–1.03)
URINALYSIS CLARITY, POC: CLEAR
URINALYSIS COLOR, POC: YELLOW
UROBILINOGEN, POC: NORMAL

## 2024-04-26 PROCEDURE — 99213 OFFICE O/P EST LOW 20 MIN: CPT | Performed by: NURSE PRACTITIONER

## 2024-04-26 PROCEDURE — G8427 DOCREV CUR MEDS BY ELIG CLIN: HCPCS | Performed by: NURSE PRACTITIONER

## 2024-04-26 PROCEDURE — 1036F TOBACCO NON-USER: CPT | Performed by: NURSE PRACTITIONER

## 2024-04-26 PROCEDURE — G8420 CALC BMI NORM PARAMETERS: HCPCS | Performed by: NURSE PRACTITIONER

## 2024-04-26 PROCEDURE — 99459 PELVIC EXAMINATION: CPT | Performed by: NURSE PRACTITIONER

## 2024-04-26 PROCEDURE — 81002 URINALYSIS NONAUTO W/O SCOPE: CPT | Performed by: NURSE PRACTITIONER

## 2024-04-26 PROCEDURE — 99395 PREV VISIT EST AGE 18-39: CPT | Performed by: NURSE PRACTITIONER

## 2024-05-02 NOTE — PERIOP NOTE
Patient verified name, , and procedure.    Type: 1a; abbreviated assessment per anesthesia guidelines    Labs per anesthesia: none    Instructed pt that they will be notified the day before their procedure by the GI Lab for time of arrival if their procedure is [place] and Pre-op for [place] cases. Arrival times should be called by 5 pm. If no phone is received the patient should contact their respective hospital. The GI lab telephone number is [phone] and ES Pre-op is [phone].     Follow diet and prep instructions per office including NPO status.  If patient has NOT received instructions from office patient is advised to call surgeon office, verbalizes understanding.    Bath or shower the night before and the am of surgery with non-moisturizing soap. No lotions, oils, powders, cologne on skin. No make up, eye make up or jewelry. Wear loose fitting comfortable, clean clothing.     Must have adult present in building the entire time .     Medications for the day of procedure- levothyroxine, patient to hold none per anesthesia guidelines. Pt instructed not to given any insulin boluses on the day of surgery.     The following discharge instructions reviewed with patient: medication given during procedure may cause drowsiness for several hours, therefore, do not drive or operate machinery for remainder of the day. You may not drink alcohol on the day of your procedure, please resume regular diet and activity unless otherwise directed. You may experience abdominal distention for several hours that is relieved by the passage of gas. Contact your physician if you have any of the following: fever or chills, severe abdominal pain or excessive amount of bleeding or a large amount when having a bowel movement. Occasional specks of blood with bowel movement would not be unusual.

## 2024-05-05 ASSESSMENT — PATIENT HEALTH QUESTIONNAIRE - PHQ9
SUM OF ALL RESPONSES TO PHQ QUESTIONS 1-9: 3
2. FEELING DOWN, DEPRESSED OR HOPELESS: NOT AT ALL
10. IF YOU CHECKED OFF ANY PROBLEMS, HOW DIFFICULT HAVE THESE PROBLEMS MADE IT FOR YOU TO DO YOUR WORK, TAKE CARE OF THINGS AT HOME, OR GET ALONG WITH OTHER PEOPLE: NOT DIFFICULT AT ALL
5. POOR APPETITE OR OVEREATING: NEARLY EVERY DAY
1. LITTLE INTEREST OR PLEASURE IN DOING THINGS: NOT AT ALL
4. FEELING TIRED OR HAVING LITTLE ENERGY: NOT AT ALL
9. THOUGHTS THAT YOU WOULD BE BETTER OFF DEAD, OR OF HURTING YOURSELF: NOT AT ALL
7. TROUBLE CONCENTRATING ON THINGS, SUCH AS READING THE NEWSPAPER OR WATCHING TELEVISION: NOT AT ALL
SUM OF ALL RESPONSES TO PHQ9 QUESTIONS 1 & 2: 0
3. TROUBLE FALLING OR STAYING ASLEEP: NOT AT ALL
4. FEELING TIRED OR HAVING LITTLE ENERGY: NOT AT ALL
10. IF YOU CHECKED OFF ANY PROBLEMS, HOW DIFFICULT HAVE THESE PROBLEMS MADE IT FOR YOU TO DO YOUR WORK, TAKE CARE OF THINGS AT HOME, OR GET ALONG WITH OTHER PEOPLE: NOT DIFFICULT AT ALL
SUM OF ALL RESPONSES TO PHQ QUESTIONS 1-9: 3
2. FEELING DOWN, DEPRESSED OR HOPELESS: NOT AT ALL
9. THOUGHTS THAT YOU WOULD BE BETTER OFF DEAD, OR OF HURTING YOURSELF: NOT AT ALL
3. TROUBLE FALLING OR STAYING ASLEEP: NOT AT ALL
5. POOR APPETITE OR OVEREATING: NEARLY EVERY DAY
1. LITTLE INTEREST OR PLEASURE IN DOING THINGS: NOT AT ALL
6. FEELING BAD ABOUT YOURSELF - OR THAT YOU ARE A FAILURE OR HAVE LET YOURSELF OR YOUR FAMILY DOWN: NOT AT ALL
7. TROUBLE CONCENTRATING ON THINGS, SUCH AS READING THE NEWSPAPER OR WATCHING TELEVISION: NOT AT ALL
6. FEELING BAD ABOUT YOURSELF - OR THAT YOU ARE A FAILURE OR HAVE LET YOURSELF OR YOUR FAMILY DOWN: NOT AT ALL
8. MOVING OR SPEAKING SO SLOWLY THAT OTHER PEOPLE COULD HAVE NOTICED. OR THE OPPOSITE - BEING SO FIDGETY OR RESTLESS THAT YOU HAVE BEEN MOVING AROUND A LOT MORE THAN USUAL: NOT AT ALL

## 2024-05-05 ASSESSMENT — ANXIETY QUESTIONNAIRES
6. BECOMING EASILY ANNOYED OR IRRITABLE: SEVERAL DAYS
2. NOT BEING ABLE TO STOP OR CONTROL WORRYING: NOT AT ALL
4. TROUBLE RELAXING: NOT AT ALL
7. FEELING AFRAID AS IF SOMETHING AWFUL MIGHT HAPPEN: NOT AT ALL
IF YOU CHECKED OFF ANY PROBLEMS ON THIS QUESTIONNAIRE, HOW DIFFICULT HAVE THESE PROBLEMS MADE IT FOR YOU TO DO YOUR WORK, TAKE CARE OF THINGS AT HOME, OR GET ALONG WITH OTHER PEOPLE: SOMEWHAT DIFFICULT
6. BECOMING EASILY ANNOYED OR IRRITABLE: SEVERAL DAYS
3. WORRYING TOO MUCH ABOUT DIFFERENT THINGS: NOT AT ALL
2. NOT BEING ABLE TO STOP OR CONTROL WORRYING: NOT AT ALL
1. FEELING NERVOUS, ANXIOUS, OR ON EDGE: NOT AT ALL
1. FEELING NERVOUS, ANXIOUS, OR ON EDGE: NOT AT ALL
GAD7 TOTAL SCORE: 1
IF YOU CHECKED OFF ANY PROBLEMS ON THIS QUESTIONNAIRE, HOW DIFFICULT HAVE THESE PROBLEMS MADE IT FOR YOU TO DO YOUR WORK, TAKE CARE OF THINGS AT HOME, OR GET ALONG WITH OTHER PEOPLE: SOMEWHAT DIFFICULT
5. BEING SO RESTLESS THAT IT IS HARD TO SIT STILL: NOT AT ALL
4. TROUBLE RELAXING: NOT AT ALL
3. WORRYING TOO MUCH ABOUT DIFFERENT THINGS: NOT AT ALL
5. BEING SO RESTLESS THAT IT IS HARD TO SIT STILL: NOT AT ALL
7. FEELING AFRAID AS IF SOMETHING AWFUL MIGHT HAPPEN: NOT AT ALL

## 2024-05-06 ENCOUNTER — OFFICE VISIT (OUTPATIENT)
Dept: BEHAVIORAL/MENTAL HEALTH CLINIC | Facility: CLINIC | Age: 29
End: 2024-05-06
Payer: COMMERCIAL

## 2024-05-06 VITALS
SYSTOLIC BLOOD PRESSURE: 100 MMHG | HEART RATE: 97 BPM | DIASTOLIC BLOOD PRESSURE: 80 MMHG | OXYGEN SATURATION: 99 % | BODY MASS INDEX: 19.36 KG/M2 | HEIGHT: 62 IN | WEIGHT: 105.2 LBS

## 2024-05-06 DIAGNOSIS — F90.0 ADHD (ATTENTION DEFICIT HYPERACTIVITY DISORDER), INATTENTIVE TYPE: ICD-10-CM

## 2024-05-06 DIAGNOSIS — F60.3 BORDERLINE PERSONALITY DISORDER (HCC): ICD-10-CM

## 2024-05-06 DIAGNOSIS — F41.9 ANXIETY DISORDER, UNSPECIFIED TYPE: ICD-10-CM

## 2024-05-06 DIAGNOSIS — F43.10 PTSD (POST-TRAUMATIC STRESS DISORDER): ICD-10-CM

## 2024-05-06 DIAGNOSIS — G47.00 INSOMNIA, UNSPECIFIED TYPE: ICD-10-CM

## 2024-05-06 DIAGNOSIS — F33.0 MILD EPISODE OF RECURRENT MAJOR DEPRESSIVE DISORDER (HCC): Primary | ICD-10-CM

## 2024-05-06 PROBLEM — F32.9 MAJOR DEPRESSIVE DISORDER: Status: RESOLVED | Noted: 2019-03-31 | Resolved: 2024-05-06

## 2024-05-06 PROCEDURE — 90792 PSYCH DIAG EVAL W/MED SRVCS: CPT | Performed by: PSYCHIATRY & NEUROLOGY

## 2024-05-06 RX ORDER — LAMOTRIGINE 150 MG/1
150 TABLET ORAL
Qty: 30 TABLET | Refills: 1 | Status: SHIPPED | OUTPATIENT
Start: 2024-05-06

## 2024-05-06 RX ORDER — ATOMOXETINE 80 MG/1
80 CAPSULE ORAL DAILY
Qty: 30 CAPSULE | Refills: 1 | Status: SHIPPED | OUTPATIENT
Start: 2024-05-06 | End: 2024-07-05

## 2024-05-06 RX ORDER — ARIPIPRAZOLE 5 MG/1
5 TABLET ORAL DAILY
Qty: 30 TABLET | Refills: 1 | Status: SHIPPED | OUTPATIENT
Start: 2024-05-06

## 2024-05-06 RX ORDER — TRAZODONE HYDROCHLORIDE 50 MG/1
50 TABLET ORAL NIGHTLY
Qty: 30 TABLET | Refills: 1 | Status: SHIPPED | OUTPATIENT
Start: 2024-05-06
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
## 2024-05-07 ENCOUNTER — ANESTHESIA EVENT (OUTPATIENT)
Dept: ENDOSCOPY | Age: 29
End: 2024-05-07
Payer: COMMERCIAL

## 2024-05-08 ENCOUNTER — ANESTHESIA (OUTPATIENT)
Dept: ENDOSCOPY | Age: 29
End: 2024-05-08
Payer: COMMERCIAL

## 2024-05-08 ENCOUNTER — HOSPITAL ENCOUNTER (OUTPATIENT)
Age: 29
Setting detail: OUTPATIENT SURGERY
Discharge: HOME | End: 2024-05-08
Attending: INTERNAL MEDICINE | Admitting: INTERNAL MEDICINE
Payer: COMMERCIAL

## 2024-05-08 VITALS
HEART RATE: 62 BPM | BODY MASS INDEX: 19.45 KG/M2 | HEIGHT: 62 IN | RESPIRATION RATE: 21 BRPM | WEIGHT: 105.7 LBS | DIASTOLIC BLOOD PRESSURE: 76 MMHG | OXYGEN SATURATION: 100 % | TEMPERATURE: 98 F | SYSTOLIC BLOOD PRESSURE: 107 MMHG

## 2024-05-08 LAB
GLUCOSE BLD STRIP.AUTO-MCNC: 74 MG/DL (ref 65–100)
GLUCOSE BLD STRIP.AUTO-MCNC: 84 MG/DL (ref 65–100)
SERVICE CMNT-IMP: NORMAL
SERVICE CMNT-IMP: NORMAL

## 2024-05-08 PROCEDURE — 2709999900 HC NON-CHARGEABLE SUPPLY: Performed by: INTERNAL MEDICINE

## 2024-05-08 PROCEDURE — 88312 SPECIAL STAINS GROUP 1: CPT

## 2024-05-08 PROCEDURE — 3609027000 HC COLONOSCOPY: Performed by: INTERNAL MEDICINE

## 2024-05-08 PROCEDURE — 6360000002 HC RX W HCPCS: Performed by: NURSE ANESTHETIST, CERTIFIED REGISTERED

## 2024-05-08 PROCEDURE — 3700000000 HC ANESTHESIA ATTENDED CARE: Performed by: INTERNAL MEDICINE

## 2024-05-08 PROCEDURE — 3700000001 HC ADD 15 MINUTES (ANESTHESIA): Performed by: INTERNAL MEDICINE

## 2024-05-08 PROCEDURE — 88309 TISSUE EXAM BY PATHOLOGIST: CPT

## 2024-05-08 PROCEDURE — 2580000003 HC RX 258: Performed by: STUDENT IN AN ORGANIZED HEALTH CARE EDUCATION/TRAINING PROGRAM

## 2024-05-08 PROCEDURE — 88305 TISSUE EXAM BY PATHOLOGIST: CPT

## 2024-05-08 PROCEDURE — 82962 GLUCOSE BLOOD TEST: CPT

## 2024-05-08 PROCEDURE — 2500000003 HC RX 250 WO HCPCS: Performed by: NURSE ANESTHETIST, CERTIFIED REGISTERED

## 2024-05-08 PROCEDURE — 7100000010 HC PHASE II RECOVERY - FIRST 15 MIN: Performed by: INTERNAL MEDICINE

## 2024-05-08 PROCEDURE — 3609012400 HC EGD TRANSORAL BIOPSY SINGLE/MULTIPLE: Performed by: INTERNAL MEDICINE

## 2024-05-08 PROCEDURE — 7100000011 HC PHASE II RECOVERY - ADDTL 15 MIN: Performed by: INTERNAL MEDICINE

## 2024-05-08 RX ORDER — SODIUM CHLORIDE 0.9 % (FLUSH) 0.9 %
5-40 SYRINGE (ML) INJECTION PRN
Status: DISCONTINUED | OUTPATIENT
Start: 2024-05-08 | End: 2024-05-08 | Stop reason: HOSPADM

## 2024-05-08 RX ORDER — PROPOFOL 10 MG/ML
INJECTION, EMULSION INTRAVENOUS PRN
Status: DISCONTINUED | OUTPATIENT
Start: 2024-05-08 | End: 2024-05-08 | Stop reason: SDUPTHER

## 2024-05-08 RX ORDER — SODIUM CHLORIDE 9 MG/ML
25 INJECTION, SOLUTION INTRAVENOUS PRN
Status: DISCONTINUED | OUTPATIENT
Start: 2024-05-08 | End: 2024-05-08 | Stop reason: HOSPADM

## 2024-05-08 RX ORDER — NALOXONE HYDROCHLORIDE 0.4 MG/ML
INJECTION, SOLUTION INTRAMUSCULAR; INTRAVENOUS; SUBCUTANEOUS PRN
Status: DISCONTINUED | OUTPATIENT
Start: 2024-05-08 | End: 2024-05-08 | Stop reason: HOSPADM

## 2024-05-08 RX ORDER — SODIUM CHLORIDE 0.9 % (FLUSH) 0.9 %
5-40 SYRINGE (ML) INJECTION EVERY 12 HOURS SCHEDULED
Status: DISCONTINUED | OUTPATIENT
Start: 2024-05-08 | End: 2024-05-08 | Stop reason: HOSPADM

## 2024-05-08 RX ORDER — LIDOCAINE HYDROCHLORIDE 20 MG/ML
INJECTION, SOLUTION EPIDURAL; INFILTRATION; INTRACAUDAL; PERINEURAL PRN
Status: DISCONTINUED | OUTPATIENT
Start: 2024-05-08 | End: 2024-05-08 | Stop reason: SDUPTHER

## 2024-05-08 RX ORDER — SODIUM CHLORIDE 9 MG/ML
INJECTION, SOLUTION INTRAVENOUS PRN
Status: DISCONTINUED | OUTPATIENT
Start: 2024-05-08 | End: 2024-05-08 | Stop reason: HOSPADM

## 2024-05-08 RX ORDER — LIDOCAINE HYDROCHLORIDE 10 MG/ML
1 INJECTION, SOLUTION INFILTRATION; PERINEURAL
Status: DISCONTINUED | OUTPATIENT
Start: 2024-05-08 | End: 2024-05-08 | Stop reason: HOSPADM

## 2024-05-08 RX ORDER — SODIUM CHLORIDE, SODIUM LACTATE, POTASSIUM CHLORIDE, CALCIUM CHLORIDE 600; 310; 30; 20 MG/100ML; MG/100ML; MG/100ML; MG/100ML
INJECTION, SOLUTION INTRAVENOUS CONTINUOUS
Status: DISCONTINUED | OUTPATIENT
Start: 2024-05-08 | End: 2024-05-08 | Stop reason: HOSPADM

## 2024-05-08 RX ADMIN — PROPOFOL 250 MCG/KG/MIN: 10 INJECTION, EMULSION INTRAVENOUS at 07:50

## 2024-05-08 RX ADMIN — SODIUM CHLORIDE, SODIUM LACTATE, POTASSIUM CHLORIDE, AND CALCIUM CHLORIDE: 600; 310; 30; 20 INJECTION, SOLUTION INTRAVENOUS at 07:37

## 2024-05-08 RX ADMIN — PROPOFOL 50 MG: 10 INJECTION, EMULSION INTRAVENOUS at 07:49

## 2024-05-08 RX ADMIN — LIDOCAINE HYDROCHLORIDE 25 MG: 20 INJECTION, SOLUTION EPIDURAL; INFILTRATION; INTRACAUDAL; PERINEURAL at 07:49

## 2024-05-08 ASSESSMENT — PAIN SCALES - GENERAL
PAINLEVEL_OUTOF10: 0

## 2024-05-08 ASSESSMENT — PAIN - FUNCTIONAL ASSESSMENT: PAIN_FUNCTIONAL_ASSESSMENT: 0-10

## 2024-05-08 NOTE — DISCHARGE INSTRUCTIONS
Gastrointestinal Esophagogastroduodenoscopy (EGD)/ Endoscopic Ultrasound(EUS)- Upper Exam Discharge Instructions    1. Call Dr. Ackerman  for any problems or questions.  2. Contact the doctor's office for follow up appointment as directed.  3. Medication may cause drowsiness for several hours, therefore, do not drive or operate machinery for remainder of the day.  4. No alcohol today.  5. Do not make any important decisions such as signing legal paperwork.  6. Ordinarily, you may resume regular diet and activity after exam unless otherwise specified by your physician.  7. For mild soreness in your throat you may use Cepacol throat lozenges or warm  salt-water gargles as needed.    Any additional instructions:   Findings:  Normal esophagus- biopsies of the distal esophagus were taken   Mild gastritis- biopsies of stomach were taken   Normal duodenum- biopsies taken            Recommendations:   F/u path results in 1-2 weeks   Smaller, more frequent low residue, low fat meals   Continue with current therapy    Return to clinic as scheduled         Gastrointestinal Colonoscopy/Flexible Sigmoidoscopy - Lower Exam Discharge Instructions  Call Dr. Ackerman for any problems or questions.  Contact the doctor’s office for follow up appointment as directed  Medication may cause drowsiness for several hours, therefore, do not drive or operate machinery for remainder of the day.  No alcohol today.  Do not make any important decisions such signing legal paperwork.  Ordinarily, you may resume regular diet and activity after exam unless otherwise specified by your physician.  Because of air put into your colon during exam, you may experience some abdominal distension, relieved by the passage of gas, for several hours.  Contact your physician if you have any of the following:  Excessive amount of bleeding - large amount when having a bowel movement.  Occasional specks of blood with bowel movement would not be unusual.  Severe abdominal

## 2024-05-08 NOTE — ANESTHESIA POSTPROCEDURE EVALUATION
Department of Anesthesiology  Postprocedure Note    Patient: Negin Pham  MRN: [id number]  YOB: 1995  Date of evaluation: 5/8/2024    Procedure Summary       Date: 05/08/24 Room / Location[place] [facility] / [facility]    Anesthesia Start: 0737 Anesthesia Stop: 0818    Procedures:       ESOPHAGOGASTRODUODENOSCOPY BIOPSY (Upper GI Region)      COLORECTAL CANCER SCREENING, NOT HIGH RISK Diagnosis:       Delayed gastric emptying      Epigastric pain      Chronic nausea      Bowel habit changes      Mucus in stool      (Delayed gastric emptying [K30])      (Epigastric pain [R10.13])      (Chronic nausea [R11.0])      (Bowel habit changes [R19.4])      (Mucus in stool [R19.5])    Surgeons: Anthony Ackerman MD Responsible Provider: Bishnu Draper MD    Anesthesia Type: TIVA ASA Status: 2            Anesthesia Type: No value filed.    Odalys Phase I: Odalys Score: 10    Odalys Phase II: Odalys Score: 10    Anesthesia Post Evaluation    Patient location during evaluation: bedside  Patient participation: complete - patient participated  Level of consciousness: awake and alert  Airway patency: patent  Nausea & Vomiting: no vomiting  Cardiovascular status: hemodynamically stable  Respiratory status: acceptable  Hydration status: euvolemic  Pain management: adequate    No notable events documented.

## 2024-05-08 NOTE — PERIOP NOTE
On wheeling out pt dexcom alarming. RN offered more ginger ale and asked pt to check dexcom and pt states sugar 96 and is fine.

## 2024-05-08 NOTE — PROCEDURES
Endoscopy Note          Operative Report    Patient: Negin Pham MRN: [id number]      YOB: 1995  Age: 29 y.o.  Sex: female            Indications:    1. Delayed gastric emptying  2. Epigastric pain  3. Chronic nausea    Preoperative Evaluation: The patient was evaluated prior to the procedure in the GI lab admission area, the patient ASA was recorded .  Consent was obtained from the patient with the risk of perforation bleeding and aspiration.    Anesthesia: YARITZA-per anesthesia  Complications: None  EBL: Unremarkable  Procedure: The patient was sedated in the left lateral decubitus position. Scope was advance from the mouth to the third portion of the duodenum. The scope was withdrawn to the stomach and a refroflexed view was performed.     Findings:  Normal esophagus- biopsies of the distal esophagus were taken   Mild gastritis- biopsies of stomach were taken   Normal duodenum- biopsies taken     Postoperative Diagnosis:  Normal exam aside from mild gastritis, no retained fluid or food      Recommendations:   F/u path results in 1-2 weeks   Smaller, more frequent low residue, low fat meals   Continue with current therapy    Return to clinic as scheduled     Signed By:  Anthony Ackerman MD     May 8, 2024

## 2024-05-08 NOTE — H&P
HISTORY AND PHYSICAL             Date: 5/8/2024        Patient Name: Negin Pham     YOB: 1995      Age:  29 y.o.      History of Present Illness   1. Delayed gastric emptying  2. Epigastric pain  3. Chronic nausea  4. Bowel habit changes  5. Mucus in stool    Past Medical History     Past Medical History:   Diagnosis Date    ADHD (attention deficit hyperactivity disorder), inattentive type     Bipolar 2 disorder (HCC)     Borderline personality disorder (HCC)     Chronic pain syndrome     Diabetes (HCC)     Type 1, Insulin pump, dexacom, Average fasting , Hypo- no longer able to tell when low, last A1C 7.0 in 4/2024    Gastroparesis     GERD (gastroesophageal reflux disease)     Daily meds    High cholesterol     History of blood transfusion 2018    Hypothyroid     Lymphadenopathy     Orthostatic hypotension     Followed by [facility] cardiology    PTSD (post-traumatic stress disorder)     Syncope     unspecified, Pt states with orthostatic hypotension        Past Surgical History     Past Surgical History:   Procedure Laterality Date    CARPAL TUNNEL RELEASE Right     SALPINGECTOMY Bilateral     TUMOR REMOVAL      VAGINAL SEPTUM RESECTION          Medications Prior to Admission     Prior to Admission medications    Medication Sig Start Date End Date Taking? Authorizing Provider   traZODone (DESYREL) 50 MG tablet Take 1 tablet by mouth nightly 5/6/24   Tj Bullock MD   lamoTRIgine (LAMICTAL) 150 MG tablet Take 1 tablet by mouth Daily with supper 5/6/24   Tj uBllock MD   ARIPiprazole (ABILIFY) 5 MG tablet Take 1 tablet by mouth daily 5/6/24   Tj Bullock MD   atomoxetine (STRATTERA) 80 MG capsule Take 1 capsule by mouth daily 5/6/24 7/5/24  Tj Bullock MD   NOVOLOG 100 UNIT/ML injection vial For use in insulin pump. Max daily dose: 60 units 4/11/24   Bettina Qiu APRN - CNP   Insulin Infusion Pump ISABELLA Basal Rates 0000: 0.75, 0300: 0.80, 0900: 1.00, 1200:

## 2024-05-08 NOTE — ANESTHESIA PRE PROCEDURE
Department of Anesthesiology  Preprocedure Note       Name:  Negin Pham   Age:  29 y.o.  :  1995                                          MRN:  [id number]         Date:  2024      Surgeon: Surgeon(s):  Anthony Ackerman MD    Procedure: Procedure(s):  ESOPHAGOGASTRODUODENOSCOPY  COLORECTAL CANCER SCREENING, NOT HIGH RISK    Medications prior to admission:   Prior to Admission medications    Medication Sig Start Date End Date Taking? Authorizing Provider   traZODone (DESYREL) 50 MG tablet Take 1 tablet by mouth nightly 24   Tj Bullock MD   lamoTRIgine (LAMICTAL) 150 MG tablet Take 1 tablet by mouth Daily with supper 24   Tj Bullock MD   ARIPiprazole (ABILIFY) 5 MG tablet Take 1 tablet by mouth daily 24   Tj Bullock MD   atomoxetine (STRATTERA) 80 MG capsule Take 1 capsule by mouth daily 24  Tj Bullock MD   NOVOLOG 100 UNIT/ML injection vial For use in insulin pump. Max daily dose: 60 units 24   Bettina Qiu APRN - CNP   Insulin Infusion Pump ISABELLA Basal Rates 0000: 0.75, 0300: 0.80, 0900: 1.00, 1200: 1.20, 2100: 1.05  Carb Ratio 0000: 15, 1200: 12, 1700: 15, 2100: 12  Correction Factor: 0000: 70  Targets  0000: 100  Active Insulin time 3 hrs  Max Bolus 20 units  Control IQ: weight: 127; TDD: 37 24   Bettina Qiu APRN - CNP   ondansetron (ZOFRAN) 4 MG tablet Take 1 tablet by mouth daily as needed for Nausea or Vomiting 3/12/24   Grinnell, Melissa R, PA-C   Ubrogepant (UBRELVY) 100 MG TABS Take 100 mg by mouth daily as needed (May repeat for 1 dose in 2 hours if needed. Not to exceed 200 mg/24 hour.) 24   Erlinda Parikh APRN   Atogepant 60 MG TABS Take 60 mg by mouth daily  Patient taking differently: Take 60 mg by mouth at bedtime 24   Erlinda Parikh APRN   Continuous Blood Gluc Transmit (DEXCOM G6 TRANSMITTER) MISC Dexcom G6 transmitter E10.65 23   Provider, MD Cristobal   Continuous Blood Gluc Sensor

## 2024-05-08 NOTE — PERIOP NOTE
Dexcom sugar 88. Pt states comfortable being d/c and going to get comething to eat on the way home.

## 2024-05-08 NOTE — PERIOP NOTE
BG 74 on arrival. Pt slow to arouse and  HR 45, once awake gave 2 sips of regular ginger ale. Pt has dexcom current sugar on dexcom 99. Pt refused recheck on hospital monitor.

## 2024-05-08 NOTE — PROCEDURES
Operative Report    Patient: Negin Pham MRN: [id number]      YOB: 1995  Age: 29 y.o.  Sex: female            Indications:    Bowel habit changes   Mucus in stool    Preoperative Evaluation: The patient was evaluated prior to the procedure in the GI lab admission area, the patient ASA was recorded .  Consent was obtained from the patient with the risk of perforation bleeding and aspiration.    Anesthesia: YARITZA-per anesthesia    Complications: None; patient tolerated the procedure well.    EBL -insignificant      Procedure: The patient was sedated in the left lateral decubitus position.  Scope was advanced from the rectum to the cecum.  Evaluation was performed to the cecum twice.  The scope was withdrawn to the rectum, retroflexed view was performed.  The rectal exam was normal.  Preparation was good.     Findings:    Internal hemorrhoids, otherwise normal exam     Postoperative Diagnosis:   Internal hemorrhoids, otherwise normal exam       Recommendations:   Repeat colonoscopy at age 47, earlier if needed from diagnostic standpoint   Return to GI clinic as recommended   If you have any issues with your hemorrhoids (bleeding, itching, bulging, irritation) please contact our [facility] GI clinic to discuss treatment options.      Signed By:  Anthony Ackerman MD     May 8, 2024

## 2024-05-09 ENCOUNTER — OFFICE VISIT (OUTPATIENT)
Age: 29
End: 2024-05-09
Payer: COMMERCIAL

## 2024-05-09 ENCOUNTER — TELEPHONE (OUTPATIENT)
Dept: OBGYN CLINIC | Age: 29
End: 2024-05-09

## 2024-05-09 VITALS
SYSTOLIC BLOOD PRESSURE: 98 MMHG | BODY MASS INDEX: 19.84 KG/M2 | DIASTOLIC BLOOD PRESSURE: 58 MMHG | HEIGHT: 62 IN | WEIGHT: 107.8 LBS | HEART RATE: 92 BPM

## 2024-05-09 DIAGNOSIS — I95.1 ORTHOSTATIC HYPOTENSION: ICD-10-CM

## 2024-05-09 DIAGNOSIS — Z87.898 HISTORY OF SYNCOPE: ICD-10-CM

## 2024-05-09 DIAGNOSIS — R00.0 TACHYCARDIA: Primary | ICD-10-CM

## 2024-05-09 DIAGNOSIS — E78.2 MIXED HYPERLIPIDEMIA: ICD-10-CM

## 2024-05-09 DIAGNOSIS — Z82.49 FAMILY HISTORY OF CARDIOMYOPATHY: ICD-10-CM

## 2024-05-09 LAB
C TRACH RRNA CVX QL NAA+PROBE: NEGATIVE
COLLECTION METHOD: ABNORMAL
CYTOLOGIST CVX/VAG CYTO: ABNORMAL
CYTOLOGY CVX/VAG DOC THIN PREP: ABNORMAL
DATE OF LMP: ABNORMAL
HPV APTIMA: POSITIVE
HPV REFLEX: ABNORMAL
Lab: ABNORMAL
N GONORRHOEA RRNA CVX QL NAA+PROBE: NEGATIVE
PAP SOURCE: ABNORMAL
PATH REPORT.FINAL DX SPEC: ABNORMAL
PATHOLOGIST CVX/VAG CYTO: ABNORMAL
PATHOLOGIST PROVIDED ICD: ABNORMAL
RECOM F/U CVX/VAG CYTO: ABNORMAL
STAT OF ADQ CVX/VAG CYTO-IMP: ABNORMAL
T VAGINALIS RRNA SPEC QL NAA+PROBE: NEGATIVE

## 2024-05-09 PROCEDURE — 99214 OFFICE O/P EST MOD 30 MIN: CPT | Performed by: INTERNAL MEDICINE

## 2024-05-09 PROCEDURE — 1036F TOBACCO NON-USER: CPT | Performed by: INTERNAL MEDICINE

## 2024-05-09 PROCEDURE — G8420 CALC BMI NORM PARAMETERS: HCPCS | Performed by: INTERNAL MEDICINE

## 2024-05-09 PROCEDURE — G8427 DOCREV CUR MEDS BY ELIG CLIN: HCPCS | Performed by: INTERNAL MEDICINE

## 2024-05-09 ASSESSMENT — ENCOUNTER SYMPTOMS
EYE REDNESS: 0
STRIDOR: 0
HEMOPTYSIS: 0
HEMATOCHEZIA: 0
ABDOMINAL PAIN: 0
WHEEZING: 0
HEMATEMESIS: 0
DOUBLE VISION: 0
HOARSE VOICE: 0

## 2024-05-09 NOTE — PROGRESS NOTES
diabetic with goal LDL less than 70    Family history of cardiomyopathy  Father  of sudden cardiac death-had advanced cardiomyopathy and coronary artery disease.   at the age of 28.  Continue rosuvastatin.       Overall Impression  -I made no changes with her medical therapy.  Orthostatic vitals showed borderline drop in systolic blood pressure by 12 mmHg-counseled on importance of using compression stockings, liberalizing salt and fluid intake.  Orthostatics are better at this time compared to when we last saw her.  We went over the results of her Holter monitor looked good with no concerning arrhythmia.  She does have episodes of sinus tachycardia but the average heart rates are within normal range.  She is due for her stress echo with her episodes of chest tightness and longstanding type 1 diabetes.  She says she will get this done towards the end of this month     On this date I have spent 35 minutes personally reviewing previous notes/outside records, test results and face to face time with the patient discussing the diagnosis and importance of compliance with the treatment plan as well as documenting on the day of the visit.       Elements of this note have been dictated using speech recognition software. As a result, errors of speech recognition may have occurred.    Return in about 6 months (around 2024) for tachycardia, chest pain, hld.     Thank you for allowing us to participate in the care of your patient.  If you have any further questions, please do not hesitate to contact us.  Sincerely,        Medhat Conley MD   2024

## 2024-05-09 NOTE — TELEPHONE ENCOUNTER
Notified pt of results. Pt v/u and all questions were answered. Message was sent to Janene to schedule pt for colpo at pts convenience.

## 2024-05-14 ENCOUNTER — HOSPITAL ENCOUNTER (EMERGENCY)
Age: 29
Discharge: HOME | End: 2024-05-14
Attending: EMERGENCY MEDICINE
Payer: COMMERCIAL

## 2024-05-14 ENCOUNTER — APPOINTMENT (OUTPATIENT)
Dept: GENERAL RADIOLOGY | Age: 29
End: 2024-05-14
Payer: COMMERCIAL

## 2024-05-14 VITALS
OXYGEN SATURATION: 94 % | HEIGHT: 62 IN | BODY MASS INDEX: 19.69 KG/M2 | HEART RATE: 107 BPM | TEMPERATURE: 98 F | RESPIRATION RATE: 18 BRPM | WEIGHT: 107 LBS | DIASTOLIC BLOOD PRESSURE: 58 MMHG | SYSTOLIC BLOOD PRESSURE: 94 MMHG

## 2024-05-14 DIAGNOSIS — E10.65 TYPE 1 DIABETES MELLITUS WITH HYPERGLYCEMIA (HCC): Primary | ICD-10-CM

## 2024-05-14 DIAGNOSIS — E86.0 DEHYDRATION: ICD-10-CM

## 2024-05-14 DIAGNOSIS — T85.694A OTHER MECHANICAL COMPLICATION OF INSULIN PUMP, INITIAL ENCOUNTER: ICD-10-CM

## 2024-05-14 LAB
ALBUMIN SERPL-MCNC: 4.5 G/DL (ref 3.5–5)
ALBUMIN/GLOB SERPL: 1.9 (ref 0.4–1.6)
ALP SERPL-CCNC: 75 U/L (ref 45–117)
ALT SERPL-CCNC: <5 U/L (ref 13–61)
ANION GAP SERPL CALC-SCNC: 25 MMOL/L (ref 2–11)
APPEARANCE UR: ABNORMAL
AST SERPL-CCNC: 18 U/L (ref 15–37)
BASE DEFICIT BLDV-SCNC: 4.9 MMOL/L
BASOPHILS # BLD: 0.1 K/UL (ref 0–0.2)
BASOPHILS NFR BLD: 1 % (ref 0–2)
BILIRUB SERPL-MCNC: 1 MG/DL (ref 0.2–1.1)
BILIRUB UR QL: NEGATIVE
BUN SERPL-MCNC: 14 MG/DL (ref 6–23)
CALCIUM SERPL-MCNC: 10 MG/DL (ref 8.3–10.4)
CHLORIDE SERPL-SCNC: 98 MMOL/L (ref 98–107)
CO2 SERPL-SCNC: 12 MMOL/L (ref 21–32)
COLOR UR: YELLOW
CREAT SERPL-MCNC: 0.89 MG/DL (ref 0.6–1)
DIFFERENTIAL METHOD BLD: ABNORMAL
EKG ATRIAL RATE: 97 BPM
EKG DIAGNOSIS: NORMAL
EKG P AXIS: 76 DEGREES
EKG P-R INTERVAL: 133 MS
EKG Q-T INTERVAL: 450 MS
EKG QRS DURATION: 81 MS
EKG QTC CALCULATION (BAZETT): 454 MS
EKG R AXIS: 65 DEGREES
EKG T AXIS: 71 DEGREES
EKG VENTRICULAR RATE: 61 BPM
EOSINOPHIL # BLD: 0 K/UL (ref 0–0.8)
EOSINOPHIL NFR BLD: 0 % (ref 0.5–7.8)
ERYTHROCYTE [DISTWIDTH] IN BLOOD BY AUTOMATED COUNT: 13 % (ref 11.9–14.6)
GLOBULIN SER CALC-MCNC: 2.4 G/DL (ref 2.8–4.5)
GLUCOSE BLD STRIP.AUTO-MCNC: 283 MG/DL (ref 65–100)
GLUCOSE BLD STRIP.AUTO-MCNC: 287 MG/DL (ref 65–100)
GLUCOSE BLD STRIP.AUTO-MCNC: 298 MG/DL (ref 65–100)
GLUCOSE BLD STRIP.AUTO-MCNC: 325 MG/DL (ref 65–100)
GLUCOSE BLD STRIP.AUTO-MCNC: 457 MG/DL (ref 65–100)
GLUCOSE SERPL-MCNC: 516 MG/DL (ref 65–100)
GLUCOSE UR STRIP.AUTO-MCNC: 500 MG/DL
HCG SERPL QL: NEGATIVE
HCO3 BLDV-SCNC: 15.8 MMOL/L (ref 23–28)
HCT VFR BLD AUTO: 40.4 % (ref 35.8–46.3)
HGB BLD-MCNC: 13.6 G/DL (ref 11.7–15.4)
HGB UR QL STRIP: NEGATIVE
IMM GRANULOCYTES # BLD AUTO: 0.1 K/UL (ref 0–0.5)
IMM GRANULOCYTES NFR BLD AUTO: 1 % (ref 0–5)
KETONES UR QL STRIP.AUTO: 80 MG/DL
LEUKOCYTE ESTERASE UR QL STRIP.AUTO: NEGATIVE
LIPASE SERPL-CCNC: 13 U/L (ref 13–60)
LYMPHOCYTES # BLD: 2.2 K/UL (ref 0.5–4.6)
LYMPHOCYTES NFR BLD: 20 % (ref 13–44)
MCH RBC QN AUTO: 29.2 PG (ref 26.1–32.9)
MCHC RBC AUTO-ENTMCNC: 33.7 G/DL (ref 31.4–35)
MCV RBC AUTO: 86.7 FL (ref 82–102)
MONOCYTES # BLD: 0.4 K/UL (ref 0.1–1.3)
MONOCYTES NFR BLD: 3 % (ref 4–12)
NEUTS SEG # BLD: 8.2 K/UL (ref 1.7–8.2)
NEUTS SEG NFR BLD: 75 % (ref 43–78)
NITRITE UR QL STRIP.AUTO: NEGATIVE
NRBC # BLD: 0 K/UL (ref 0–0.2)
PCO2 BLDV: 20.1 MMHG (ref 41–51)
PH BLDV: 7.5 (ref 7.32–7.42)
PH UR STRIP: 6 (ref 5–9)
PLATELET # BLD AUTO: 294 K/UL (ref 150–450)
PMV BLD AUTO: 12.4 FL (ref 9.4–12.3)
PO2 BLDV: 25 MMHG
POTASSIUM SERPL-SCNC: 3.8 MMOL/L (ref 3.5–5.1)
PROT SERPL-MCNC: 6.9 G/DL (ref 6.4–8.2)
PROT UR STRIP-MCNC: NEGATIVE MG/DL
RBC # BLD AUTO: 4.66 M/UL (ref 4.05–5.2)
SAO2 % BLDV: 55 % (ref 65–88)
SERVICE CMNT-IMP: ABNORMAL
SODIUM SERPL-SCNC: 135 MMOL/L (ref 133–143)
SP GR UR REFRACTOMETRY: 1.01 (ref 1–1.02)
SPECIMEN TYPE: ABNORMAL
UROBILINOGEN UR QL STRIP.AUTO: 0.2 EU/DL (ref 0.2–1)
WBC # BLD AUTO: 10.9 K/UL (ref 4.3–11.1)

## 2024-05-14 PROCEDURE — 93005 ELECTROCARDIOGRAM TRACING: CPT | Performed by: EMERGENCY MEDICINE

## 2024-05-14 PROCEDURE — 81003 URINALYSIS AUTO W/O SCOPE: CPT

## 2024-05-14 PROCEDURE — 99285 EMERGENCY DEPT VISIT HI MDM: CPT

## 2024-05-14 PROCEDURE — 85025 COMPLETE CBC W/AUTO DIFF WBC: CPT

## 2024-05-14 PROCEDURE — 80053 COMPREHEN METABOLIC PANEL: CPT

## 2024-05-14 PROCEDURE — 84703 CHORIONIC GONADOTROPIN ASSAY: CPT

## 2024-05-14 PROCEDURE — 93010 ELECTROCARDIOGRAM REPORT: CPT | Performed by: INTERNAL MEDICINE

## 2024-05-14 PROCEDURE — 82803 BLOOD GASES ANY COMBINATION: CPT

## 2024-05-14 PROCEDURE — 2580000003 HC RX 258: Performed by: EMERGENCY MEDICINE

## 2024-05-14 PROCEDURE — 83690 ASSAY OF LIPASE: CPT

## 2024-05-14 PROCEDURE — 96374 THER/PROPH/DIAG INJ IV PUSH: CPT

## 2024-05-14 PROCEDURE — 96361 HYDRATE IV INFUSION ADD-ON: CPT

## 2024-05-14 PROCEDURE — 74022 RADEX COMPL AQT ABD SERIES: CPT

## 2024-05-14 PROCEDURE — 82962 GLUCOSE BLOOD TEST: CPT

## 2024-05-14 PROCEDURE — 6370000000 HC RX 637 (ALT 250 FOR IP): Performed by: EMERGENCY MEDICINE

## 2024-05-14 PROCEDURE — 96375 TX/PRO/DX INJ NEW DRUG ADDON: CPT

## 2024-05-14 PROCEDURE — 6360000002 HC RX W HCPCS: Performed by: EMERGENCY MEDICINE

## 2024-05-14 RX ORDER — ONDANSETRON 4 MG/1
4 TABLET, FILM COATED ORAL 3 TIMES DAILY PRN
Qty: 15 TABLET | Refills: 0 | Status: SHIPPED | OUTPATIENT
Start: 2024-05-14 | End: 2024-05-14 | Stop reason: ALTCHOICE

## 2024-05-14 RX ORDER — 0.9 % SODIUM CHLORIDE 0.9 %
1000 INTRAVENOUS SOLUTION INTRAVENOUS
Status: COMPLETED | OUTPATIENT
Start: 2024-05-14 | End: 2024-05-14

## 2024-05-14 RX ORDER — ONDANSETRON 2 MG/ML
4 INJECTION INTRAMUSCULAR; INTRAVENOUS
Status: COMPLETED | OUTPATIENT
Start: 2024-05-14 | End: 2024-05-14

## 2024-05-14 RX ORDER — METOCLOPRAMIDE HYDROCHLORIDE 5 MG/ML
5 INJECTION INTRAMUSCULAR; INTRAVENOUS
Status: COMPLETED | OUTPATIENT
Start: 2024-05-14 | End: 2024-05-14

## 2024-05-14 RX ORDER — METOCLOPRAMIDE 5 MG/1
5 TABLET ORAL 3 TIMES DAILY PRN
Qty: 20 TABLET | Refills: 0 | Status: SHIPPED | OUTPATIENT
Start: 2024-05-14

## 2024-05-14 RX ADMIN — METOCLOPRAMIDE 5 MG: 5 INJECTION, SOLUTION INTRAMUSCULAR; INTRAVENOUS at 12:10

## 2024-05-14 RX ADMIN — ONDANSETRON 4 MG: 2 INJECTION INTRAMUSCULAR; INTRAVENOUS at 08:43

## 2024-05-14 RX ADMIN — SODIUM CHLORIDE 1000 ML: 9 INJECTION, SOLUTION INTRAVENOUS at 12:09

## 2024-05-14 RX ADMIN — SODIUM CHLORIDE 1000 ML: 9 INJECTION, SOLUTION INTRAVENOUS at 08:35

## 2024-05-14 RX ADMIN — INSULIN HUMAN 5 UNITS: 100 INJECTION, SOLUTION PARENTERAL at 08:41

## 2024-05-14 ASSESSMENT — PAIN SCALES - GENERAL: PAINLEVEL_OUTOF10: 7

## 2024-05-14 ASSESSMENT — ENCOUNTER SYMPTOMS
NAUSEA: 1
VOMITING: 1
ABDOMINAL PAIN: 1
ABDOMINAL DISTENTION: 0

## 2024-05-14 ASSESSMENT — PAIN DESCRIPTION - LOCATION: LOCATION: ABDOMEN

## 2024-05-14 ASSESSMENT — PAIN DESCRIPTION - ORIENTATION: ORIENTATION: LEFT;UPPER

## 2024-05-14 ASSESSMENT — PAIN DESCRIPTION - PAIN TYPE: TYPE: ACUTE PAIN

## 2024-05-14 ASSESSMENT — PAIN - FUNCTIONAL ASSESSMENT
PAIN_FUNCTIONAL_ASSESSMENT: 0-10
PAIN_FUNCTIONAL_ASSESSMENT: NONE - DENIES PAIN

## 2024-05-14 NOTE — ED PROVIDER NOTES
Emergency Department Provider Note       PCP: Di Jones, APRN - CNP   Age: 29 y.o.   Sex: female     DISPOSITION Decision To Discharge 05/14/2024 10:58:27 AM       ICD-10-CM    1. Type 1 diabetes mellitus with hyperglycemia (HCC)  E10.65       2. Other mechanical complication of insulin pump, initial encounter  T85.694A       3. Dehydration  E86.0           Medical Decision Making     DDX:    Medication noncompliance, diabetic diet noncompliance,    Hyperglycemia, diabetic ketoacidosis, diabetic coma,    Electrolyte abnormality, dehydration,    Infection, UTI,      ED Course as of 05/14/24 1319   Tue May 14, 2024   1045 Patient doing better with the IV fluids and Zofran given to her in the emergency department.  Her blood sugar has come down to 298.  Her insulin pump site has been changed and is functioning.  Patient and I talked about doing an oral trial here and if she is able to keep down food and liquids discharged to home to allow her insulin pump to regulate the remaining hyperglycemia. [KH]   1201 Patient started feeling worse after the p.o. challenge.  She put her insulin pump back  Says that her pump is now reading 400.  Her blood sugar will be rechecked. [KH]   1316 Patient now feeling better.  She states that she has the Zofran at home and it does not seem to work that well so she will be given a prescription for Reglan to be used as needed. [KH]      ED Course User Index  [KH] Anirudh Dempsey, DO     1 or more chronic illnesses with a severe exacerbation or progression.  Prescription drug management performed.  Shared medical decision making was utilized in creating the patients health plan today.    I independently ordered and reviewed each unique test.  I reviewed external records: provider visit note from PCP.  I reviewed external records: provider visit note from outside specialist.  I reviewed external records: previous lab results from outside ED.  I reviewed external records: previous

## 2024-05-14 NOTE — ED NOTES
Patient states she is feeling anxious and has a ketone taste in her mouth. Patient states she has turned on her dexcom and insulin pump again and that her dexcom is reading above 400.     Subsequently POC FSBS is currently reads 325.     Dr Dempsey has been notified. ]  Patient was instructed to turn of the insulin pump so that we can regulate the amount of insulin she is getting.

## 2024-05-14 NOTE — ED NOTES
Patient recommended that Dr Dempsey consult her Endocrinologist and has provided the contact info for Dr Bettina Qiu. I have passed on that information to Dr Dempsey.

## 2024-05-14 NOTE — ED TRIAGE NOTES
Patient has an Insulin Pump. Per my instructions patient has turned it off.    Patient states she had changed the site around 0700 this morning, due to her feeling poorly.
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
## 2024-05-14 NOTE — DISCHARGE INSTRUCTIONS
Take Reglan every 6 hours as needed for nausea  Monitor your blood sugar closely over the next 48 hours  Make sure you are staying well-hydrated  Schedule outpatient follow-up appointment to see your family doctor in the next 3 to 4 days for repeat evaluation.  If you start developing any new or concerning symptoms return to the ER at that time

## 2024-05-15 ENCOUNTER — OFFICE VISIT (OUTPATIENT)
Dept: FAMILY MEDICINE CLINIC | Facility: CLINIC | Age: 29
End: 2024-05-15
Payer: COMMERCIAL

## 2024-05-15 VITALS
HEART RATE: 75 BPM | HEIGHT: 62 IN | OXYGEN SATURATION: 99 % | WEIGHT: 106 LBS | BODY MASS INDEX: 19.51 KG/M2 | DIASTOLIC BLOOD PRESSURE: 60 MMHG | SYSTOLIC BLOOD PRESSURE: 110 MMHG | TEMPERATURE: 98.4 F | RESPIRATION RATE: 16 BRPM

## 2024-05-15 DIAGNOSIS — J02.9 PHARYNGITIS, UNSPECIFIED ETIOLOGY: Primary | ICD-10-CM

## 2024-05-15 PROCEDURE — G8420 CALC BMI NORM PARAMETERS: HCPCS | Performed by: NURSE PRACTITIONER

## 2024-05-15 PROCEDURE — 1036F TOBACCO NON-USER: CPT | Performed by: NURSE PRACTITIONER

## 2024-05-15 PROCEDURE — 99213 OFFICE O/P EST LOW 20 MIN: CPT | Performed by: NURSE PRACTITIONER

## 2024-05-15 PROCEDURE — G8427 DOCREV CUR MEDS BY ELIG CLIN: HCPCS | Performed by: NURSE PRACTITIONER

## 2024-05-15 RX ORDER — AMOXICILLIN 500 MG/1
500 CAPSULE ORAL 2 TIMES DAILY
Qty: 20 CAPSULE | Refills: 0 | Status: SHIPPED | OUTPATIENT
Start: 2024-05-15 | End: 2024-05-25

## 2024-05-15 SDOH — ECONOMIC STABILITY: FOOD INSECURITY: WITHIN THE PAST 12 MONTHS, YOU WORRIED THAT YOUR FOOD WOULD RUN OUT BEFORE YOU GOT MONEY TO BUY MORE.: NEVER TRUE

## 2024-05-15 SDOH — ECONOMIC STABILITY: INCOME INSECURITY: HOW HARD IS IT FOR YOU TO PAY FOR THE VERY BASICS LIKE FOOD, HOUSING, MEDICAL CARE, AND HEATING?: NOT HARD AT ALL

## 2024-05-15 SDOH — ECONOMIC STABILITY: FOOD INSECURITY: WITHIN THE PAST 12 MONTHS, THE FOOD YOU BOUGHT JUST DIDN'T LAST AND YOU DIDN'T HAVE MONEY TO GET MORE.: NEVER TRUE

## 2024-05-15 ASSESSMENT — PATIENT HEALTH QUESTIONNAIRE - PHQ9
1. LITTLE INTEREST OR PLEASURE IN DOING THINGS: NOT AT ALL
SUM OF ALL RESPONSES TO PHQ QUESTIONS 1-9: 0
SUM OF ALL RESPONSES TO PHQ9 QUESTIONS 1 & 2: 0
SUM OF ALL RESPONSES TO PHQ QUESTIONS 1-9: 0
SUM OF ALL RESPONSES TO PHQ QUESTIONS 1-9: 0
2. FEELING DOWN, DEPRESSED OR HOPELESS: NOT AT ALL
SUM OF ALL RESPONSES TO PHQ QUESTIONS 1-9: 0

## 2024-05-15 NOTE — PROGRESS NOTES
Past Surgical History:   Procedure Laterality Date    CARPAL TUNNEL RELEASE Right     COLONOSCOPY N/A 2024    COLORECTAL CANCER SCREENING, NOT HIGH RISK performed by Anthony Ackerman MD at [facility] ENDOSCOPY    SALPINGECTOMY Bilateral     TUMOR REMOVAL      UPPER GASTROINTESTINAL ENDOSCOPY N/A 2024    ESOPHAGOGASTRODUODENOSCOPY BIOPSY performed by Anthony Ackerman MD at [facility] ENDOSCOPY    VAGINAL SEPTUM RESECTION       Family History   Problem Relation Age of Onset    High Cholesterol Mother     Heart Failure Father     Sudden Death Father     No Known Problems Sister     Other Brother     Cancer Maternal Grandmother         cervical cancer    No Known Problems Maternal Grandfather     Cancer Paternal Grandmother     No Known Problems Paternal Grandfather     No Known Problems Other      Social History     Socioeconomic History    Marital status:      Spouse name: None    Number of children: None    Years of education: None    Highest education level: None   Tobacco Use    Smoking status: Former     Types: Cigarettes     Start date:      Quit date:      Years since quittin.3    Smokeless tobacco: Never   Vaping Use    Vaping Use: Never used   Substance and Sexual Activity    Alcohol use: Never    Drug use: Never    Sexual activity: Yes     Partners: Male     Birth control/protection: I.U.D.     Social Determinants of Health     Financial Resource Strain: Low Risk  (5/15/2024)    Overall Financial Resource Strain (CARDIA)     Difficulty of Paying Living Expenses: Not hard at all   Food Insecurity: No Food Insecurity (5/15/2024)    Hunger Vital Sign     Worried About Running Out of Food in the Last Year: Never true     Ran Out of Food in the Last Year: Never true   Transportation Needs: Unknown (5/15/2024)    PRAPARE - Transportation     Lack of Transportation (Non-Medical): No   Housing Stability: Unknown (5/15/2024)    Housing Stability Vital Sign     Unstable Housing in the Last Year: No

## 2024-05-22 ENCOUNTER — OFFICE VISIT (OUTPATIENT)
Dept: OBGYN CLINIC | Age: 29
End: 2024-05-22
Payer: COMMERCIAL

## 2024-05-22 ENCOUNTER — PROCEDURE VISIT (OUTPATIENT)
Dept: OBGYN CLINIC | Age: 29
End: 2024-05-22
Payer: COMMERCIAL

## 2024-05-22 VITALS
SYSTOLIC BLOOD PRESSURE: 97 MMHG | BODY MASS INDEX: 19.1 KG/M2 | DIASTOLIC BLOOD PRESSURE: 63 MMHG | WEIGHT: 103.8 LBS | HEIGHT: 62 IN

## 2024-05-22 DIAGNOSIS — R10.2 PELVIC PAIN: Primary | ICD-10-CM

## 2024-05-22 PROCEDURE — G8427 DOCREV CUR MEDS BY ELIG CLIN: HCPCS | Performed by: NURSE PRACTITIONER

## 2024-05-22 PROCEDURE — G8420 CALC BMI NORM PARAMETERS: HCPCS | Performed by: NURSE PRACTITIONER

## 2024-05-22 PROCEDURE — 1036F TOBACCO NON-USER: CPT | Performed by: NURSE PRACTITIONER

## 2024-05-22 PROCEDURE — 99214 OFFICE O/P EST MOD 30 MIN: CPT | Performed by: NURSE PRACTITIONER

## 2024-05-22 PROCEDURE — 76830 TRANSVAGINAL US NON-OB: CPT | Performed by: STUDENT IN AN ORGANIZED HEALTH CARE EDUCATION/TRAINING PROGRAM

## 2024-06-07 ENCOUNTER — OFFICE VISIT (OUTPATIENT)
Dept: OBGYN CLINIC | Age: 29
End: 2024-06-07
Payer: COMMERCIAL

## 2024-06-07 VITALS
BODY MASS INDEX: 19.32 KG/M2 | SYSTOLIC BLOOD PRESSURE: 110 MMHG | HEIGHT: 62 IN | WEIGHT: 105 LBS | DIASTOLIC BLOOD PRESSURE: 72 MMHG

## 2024-06-07 DIAGNOSIS — R87.610 ATYPICAL SQUAMOUS CELLS OF UNDETERMINED SIGNIFICANCE (ASCUS) ON PAPANICOLAOU SMEAR OF CERVIX: ICD-10-CM

## 2024-06-07 DIAGNOSIS — N93.9 ABNORMAL UTERINE BLEEDING (AUB): ICD-10-CM

## 2024-06-07 DIAGNOSIS — M62.89 PELVIC FLOOR DYSFUNCTION: ICD-10-CM

## 2024-06-07 DIAGNOSIS — N94.6 DYSMENORRHEA: Primary | ICD-10-CM

## 2024-06-07 PROCEDURE — G8428 CUR MEDS NOT DOCUMENT: HCPCS | Performed by: STUDENT IN AN ORGANIZED HEALTH CARE EDUCATION/TRAINING PROGRAM

## 2024-06-07 PROCEDURE — 1036F TOBACCO NON-USER: CPT | Performed by: STUDENT IN AN ORGANIZED HEALTH CARE EDUCATION/TRAINING PROGRAM

## 2024-06-07 PROCEDURE — 99214 OFFICE O/P EST MOD 30 MIN: CPT | Performed by: STUDENT IN AN ORGANIZED HEALTH CARE EDUCATION/TRAINING PROGRAM

## 2024-06-07 PROCEDURE — G8420 CALC BMI NORM PARAMETERS: HCPCS | Performed by: STUDENT IN AN ORGANIZED HEALTH CARE EDUCATION/TRAINING PROGRAM

## 2024-06-07 RX ORDER — LORAZEPAM 1 MG/1
1 TABLET ORAL ONCE
Qty: 1 TABLET | Refills: 0 | Status: SHIPPED | OUTPATIENT
Start: 2024-06-07 | End: 2024-06-07

## 2024-06-07 NOTE — PROGRESS NOTES
Negin Pham (: 1995) is a 29 y.o. , Established patient, here for evaluation of the following chief complaint(s): hysterectomy consult     HPI:  Per Last visit on 24:  Patient has irregular bleeding and pain. Patient had IUD placed in . Patient was prescribed an OCP to aid in resolving irregular bleeding, but has ongoing pelvic pain that has worsened in the last 6 months.      She had dx l/s with neg endometriosis.     Pt with long standing hx pelvic pain.  She reports she has had BS 2023, had l/s, pt states was told she did not have endometriosis.   She reports her mother had severe endometriosis.  Feels like she is being clawed from the inside out,   She did not feel like her pain got better in pregnancy.   Pain comes and goes thought the day but has daily pain. Pain described as midline and pelvic c/w menstrual type cramping   Sometimes a shooting pain.  She has a Mirena IUD that was placed 2023.   She has irreg bleeding with IUD in place.   An OCP was added to see if would help with her irreg bleeding which helped but she still had severe pelvic pain.   She was on OCP for 6-7 months and she d/c'd as it was not helpful     She notes she has GI issues but mostly upper GI. She does have colonosopy scheduled.  Pt feels her pain sometimes is pelvic but she reports a lot of her pain is deep behind her mons, possibly vaginal pain.  + pain with intercourse. + pelvic pain with BM.   Again, notes neg endo on l/s last year. Op note reviewed.      Pt states she has no tubes and really just wants a hysterectomy at this point to see if helps improve her chronic pelvic pain.    US Findings Today: 2024  GYN U/S SECONDARY TO PELVIC PAIN   CX APPEARS WNL, IUD STRINGS VISUALIZED IN CANAL   UTERUS ANTEVERTED AND HETEROGENOUS   ENDO DISTENDED BY A SMALL AMOUNT OF FLUID. WALLS= 2 MM, DISTENDED TO 5.4 MM WITH FLUID. NO   INTRACAVITY MASSES VISUALIZED. IUD IS FUNDAL WITH T ARMS EXTENDED LATERALLY.

## 2024-06-12 ENCOUNTER — PATIENT MESSAGE (OUTPATIENT)
Dept: OBGYN CLINIC | Age: 29
End: 2024-06-12

## 2024-06-12 DIAGNOSIS — M62.89 PELVIC FLOOR DYSFUNCTION: Primary | ICD-10-CM

## 2024-06-14 ASSESSMENT — PATIENT HEALTH QUESTIONNAIRE - PHQ9
SUM OF ALL RESPONSES TO PHQ QUESTIONS 1-9: 0
6. FEELING BAD ABOUT YOURSELF - OR THAT YOU ARE A FAILURE OR HAVE LET YOURSELF OR YOUR FAMILY DOWN: NOT AT ALL
7. TROUBLE CONCENTRATING ON THINGS, SUCH AS READING THE NEWSPAPER OR WATCHING TELEVISION: NOT AT ALL
SUM OF ALL RESPONSES TO PHQ QUESTIONS 1-9: 0
1. LITTLE INTEREST OR PLEASURE IN DOING THINGS: NOT AT ALL
7. TROUBLE CONCENTRATING ON THINGS, SUCH AS READING THE NEWSPAPER OR WATCHING TELEVISION: NOT AT ALL
10. IF YOU CHECKED OFF ANY PROBLEMS, HOW DIFFICULT HAVE THESE PROBLEMS MADE IT FOR YOU TO DO YOUR WORK, TAKE CARE OF THINGS AT HOME, OR GET ALONG WITH OTHER PEOPLE: NOT DIFFICULT AT ALL
2. FEELING DOWN, DEPRESSED OR HOPELESS: NOT AT ALL
SUM OF ALL RESPONSES TO PHQ QUESTIONS 1-9: 0
6. FEELING BAD ABOUT YOURSELF - OR THAT YOU ARE A FAILURE OR HAVE LET YOURSELF OR YOUR FAMILY DOWN: NOT AT ALL
3. TROUBLE FALLING OR STAYING ASLEEP: NOT AT ALL
8. MOVING OR SPEAKING SO SLOWLY THAT OTHER PEOPLE COULD HAVE NOTICED. OR THE OPPOSITE, BEING SO FIGETY OR RESTLESS THAT YOU HAVE BEEN MOVING AROUND A LOT MORE THAN USUAL: NOT AT ALL
SUM OF ALL RESPONSES TO PHQ QUESTIONS 1-9: 0
5. POOR APPETITE OR OVEREATING: NOT AT ALL
8. MOVING OR SPEAKING SO SLOWLY THAT OTHER PEOPLE COULD HAVE NOTICED. OR THE OPPOSITE - BEING SO FIDGETY OR RESTLESS THAT YOU HAVE BEEN MOVING AROUND A LOT MORE THAN USUAL: NOT AT ALL
9. THOUGHTS THAT YOU WOULD BE BETTER OFF DEAD, OR OF HURTING YOURSELF: NOT AT ALL
3. TROUBLE FALLING OR STAYING ASLEEP: NOT AT ALL
SUM OF ALL RESPONSES TO PHQ9 QUESTIONS 1 & 2: 0
SUM OF ALL RESPONSES TO PHQ QUESTIONS 1-9: 0
10. IF YOU CHECKED OFF ANY PROBLEMS, HOW DIFFICULT HAVE THESE PROBLEMS MADE IT FOR YOU TO DO YOUR WORK, TAKE CARE OF THINGS AT HOME, OR GET ALONG WITH OTHER PEOPLE: NOT DIFFICULT AT ALL
2. FEELING DOWN, DEPRESSED OR HOPELESS: NOT AT ALL
4. FEELING TIRED OR HAVING LITTLE ENERGY: NOT AT ALL
9. THOUGHTS THAT YOU WOULD BE BETTER OFF DEAD, OR OF HURTING YOURSELF: NOT AT ALL
4. FEELING TIRED OR HAVING LITTLE ENERGY: NOT AT ALL
5. POOR APPETITE OR OVEREATING: NOT AT ALL
1. LITTLE INTEREST OR PLEASURE IN DOING THINGS: NOT AT ALL

## 2024-06-14 ASSESSMENT — ANXIETY QUESTIONNAIRES
6. BECOMING EASILY ANNOYED OR IRRITABLE: NOT AT ALL
1. FEELING NERVOUS, ANXIOUS, OR ON EDGE: NOT AT ALL
2. NOT BEING ABLE TO STOP OR CONTROL WORRYING: NOT AT ALL
1. FEELING NERVOUS, ANXIOUS, OR ON EDGE: NOT AT ALL
5. BEING SO RESTLESS THAT IT IS HARD TO SIT STILL: NOT AT ALL
2. NOT BEING ABLE TO STOP OR CONTROL WORRYING: NOT AT ALL
4. TROUBLE RELAXING: NOT AT ALL
3. WORRYING TOO MUCH ABOUT DIFFERENT THINGS: NOT AT ALL
3. WORRYING TOO MUCH ABOUT DIFFERENT THINGS: NOT AT ALL
7. FEELING AFRAID AS IF SOMETHING AWFUL MIGHT HAPPEN: NOT AT ALL
IF YOU CHECKED OFF ANY PROBLEMS ON THIS QUESTIONNAIRE, HOW DIFFICULT HAVE THESE PROBLEMS MADE IT FOR YOU TO DO YOUR WORK, TAKE CARE OF THINGS AT HOME, OR GET ALONG WITH OTHER PEOPLE: NOT DIFFICULT AT ALL
6. BECOMING EASILY ANNOYED OR IRRITABLE: NOT AT ALL
IF YOU CHECKED OFF ANY PROBLEMS ON THIS QUESTIONNAIRE, HOW DIFFICULT HAVE THESE PROBLEMS MADE IT FOR YOU TO DO YOUR WORK, TAKE CARE OF THINGS AT HOME, OR GET ALONG WITH OTHER PEOPLE: NOT DIFFICULT AT ALL
5. BEING SO RESTLESS THAT IT IS HARD TO SIT STILL: NOT AT ALL
4. TROUBLE RELAXING: NOT AT ALL
7. FEELING AFRAID AS IF SOMETHING AWFUL MIGHT HAPPEN: NOT AT ALL
GAD7 TOTAL SCORE: 0

## 2024-06-17 ENCOUNTER — OFFICE VISIT (OUTPATIENT)
Dept: BEHAVIORAL/MENTAL HEALTH CLINIC | Facility: CLINIC | Age: 29
End: 2024-06-17
Payer: COMMERCIAL

## 2024-06-17 VITALS
BODY MASS INDEX: 18.84 KG/M2 | HEART RATE: 72 BPM | WEIGHT: 102.4 LBS | DIASTOLIC BLOOD PRESSURE: 60 MMHG | SYSTOLIC BLOOD PRESSURE: 96 MMHG | OXYGEN SATURATION: 100 % | HEIGHT: 62 IN

## 2024-06-17 DIAGNOSIS — F41.9 ANXIETY DISORDER, UNSPECIFIED TYPE: ICD-10-CM

## 2024-06-17 DIAGNOSIS — F60.3 BORDERLINE PERSONALITY DISORDER (HCC): ICD-10-CM

## 2024-06-17 DIAGNOSIS — G47.00 INSOMNIA, UNSPECIFIED TYPE: ICD-10-CM

## 2024-06-17 DIAGNOSIS — F90.0 ADHD (ATTENTION DEFICIT HYPERACTIVITY DISORDER), INATTENTIVE TYPE: ICD-10-CM

## 2024-06-17 DIAGNOSIS — F33.0 MILD EPISODE OF RECURRENT MAJOR DEPRESSIVE DISORDER (HCC): Primary | ICD-10-CM

## 2024-06-17 DIAGNOSIS — F43.10 PTSD (POST-TRAUMATIC STRESS DISORDER): ICD-10-CM

## 2024-06-17 PROBLEM — F31.9 BIPOLAR 1 DISORDER (HCC): Status: RESOLVED | Noted: 2024-02-20 | Resolved: 2024-06-17

## 2024-06-17 PROCEDURE — 99214 OFFICE O/P EST MOD 30 MIN: CPT | Performed by: PSYCHIATRY & NEUROLOGY

## 2024-06-17 PROCEDURE — 90833 PSYTX W PT W E/M 30 MIN: CPT | Performed by: PSYCHIATRY & NEUROLOGY

## 2024-06-17 RX ORDER — ARIPIPRAZOLE 5 MG/1
5 TABLET ORAL DAILY
Qty: 30 TABLET | Refills: 1 | Status: SHIPPED | OUTPATIENT
Start: 2024-06-17

## 2024-06-17 RX ORDER — TRAZODONE HYDROCHLORIDE 50 MG/1
50 TABLET ORAL NIGHTLY
Qty: 30 TABLET | Refills: 1 | Status: SHIPPED | OUTPATIENT
Start: 2024-06-17

## 2024-06-17 RX ORDER — LAMOTRIGINE 150 MG/1
150 TABLET ORAL
Qty: 30 TABLET | Refills: 1 | Status: SHIPPED | OUTPATIENT
Start: 2024-06-17
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
## 2024-06-17 NOTE — PROGRESS NOTES
Enhanced Recovery After GYN Surgery: Diabetic patients    It is highly recommended you purchase and drink Glucerna- one bottle twice daily for five days beginning on 07/13/24 and stopping two days prior to surgery 07/17/24. If Glucerna is not available, drink 1/2 bottle of Ensure Complete four times daily. Do not drink any Glucerna (or Ensure Complete) the day before surgery. It is recommended that you continue drinking this for one month after surgery if ok with your physician.    Follow your surgeon's instructions regarding diet in the days leading up to your surgery and regarding any bowel preparation.     The night before surgery 07/18/24, drink two bottles of the Ensure Pre-Surgery drink.    The morning of surgery 07/19/24, drink HALF bottle of the Ensure Pre-Surgery drink 2 hours prior to your arrival to the hospital unless your last HgbA1C >8 or average glucose is >180 or other contraindications. Drink this over 5-10 minutes.    Drink nothing else after drinking the Ensure Pre-surgery drink the morning of surgery.    Bring your patient handbook with you to the hospital.      Things to remember:    1. You will be given clear liquids to drink, advancing diet as tolerated    2. You will be up and moving around with assistance 2-4 hours after surgery.    3. You will be given regularly scheduled pain medications (NSAIDS, Tylenol) with narcotics as needed.    4. You may be able to go home that night if the surgeon okays and you are up and eating and drinking. Otherwise, your discharge will be the following morning around lunch time.     5. Continue drinking Glucerna for 5 days after surgery.

## 2024-06-25 ENCOUNTER — PATIENT MESSAGE (OUTPATIENT)
Age: 29
End: 2024-06-25

## 2024-06-26 ENCOUNTER — PROCEDURE VISIT (OUTPATIENT)
Dept: OBGYN CLINIC | Age: 29
End: 2024-06-26
Payer: COMMERCIAL

## 2024-06-26 VITALS — WEIGHT: 108.1 LBS | BODY MASS INDEX: 19.89 KG/M2 | HEIGHT: 62 IN

## 2024-06-26 DIAGNOSIS — R87.610 ATYPICAL SQUAMOUS CELLS OF UNDETERMINED SIGNIFICANCE (ASCUS) ON PAPANICOLAOU SMEAR OF CERVIX: Primary | ICD-10-CM

## 2024-06-26 DIAGNOSIS — Z01.812 PRE-PROCEDURAL LABORATORY EXAMINATION: ICD-10-CM

## 2024-06-26 LAB
HCG, PREGNANCY, URINE, POC: NEGATIVE
VALID INTERNAL CONTROL, POC: POSITIVE

## 2024-06-26 PROCEDURE — 57454 BX/CURETT OF CERVIX W/SCOPE: CPT | Performed by: STUDENT IN AN ORGANIZED HEALTH CARE EDUCATION/TRAINING PROGRAM

## 2024-06-26 PROCEDURE — 81025 URINE PREGNANCY TEST: CPT | Performed by: STUDENT IN AN ORGANIZED HEALTH CARE EDUCATION/TRAINING PROGRAM

## 2024-06-26 NOTE — TELEPHONE ENCOUNTER
Received voicemail from pt requesting to schedule follow up with Shyanne as discussed via MyChart. Returned call to pt and scheduled follow up OV for 8/9 at 0900. Gave address for [facility] location. Instructed pt to call with any questions, concerns, or worsening sx prior to OV.

## 2024-06-26 NOTE — PROGRESS NOTES
Patient verified name and     Order for consent not found in EHR ; patient verified.     Type 2 surgery    Labs per surgeon: none; orders not received.  Labs per anesthesia protocol: HGB  EKG: in EMR dated 24 (wihtin protocols)    Stress/echo 24 in EMR if needed.       Patient informed of GYN class on 7/3/24 at which time labs will be drawn. Patient will also receive all patient education and if staying overnight will receive hospital approved surgical skin cleanser; if not, patient will use non-moisturizing soap.    Patient instructed to hold all vitamins 7 days prior to surgery and NSAIDS 5 days prior to surgery, patient verbalized understanding.    Patient instructed to continue previous medications as prescribed prior to surgery and to take the following medications the day of surgery according to anesthesia guidelines with a small sip of water: ABILIFY.  Will keep insulin pump at basal rate for surgery.     Patient answered medical/surgical history questions at their best of ability. All prior to admission medications documented in [facility].

## 2024-06-26 NOTE — PROGRESS NOTES
FLEXTOUCH) 100 UNIT/ML SOPN 30 units every Every 24 hours if pump failure.      levonorgestrel (MIRENA, 52 MG,) IUD 52 mg 1 each by IntraUTERine route      levothyroxine (SYNTHROID) 25 MCG tablet Take 1 tablet by mouth daily      rosuvastatin (CRESTOR) 5 MG tablet Take 1 tablet by mouth at bedtime       No current facility-administered medications on file prior to visit.     Allergies:    Allergies as of 06/26/2024 - Fully Reviewed 06/17/2024   Allergen Reaction Noted    Latex Itching 06/09/2017    Adhesive tape Itching 06/09/2017    Cyproheptadine Rash 07/09/2015       uHCG:  negative    Abnormal Pap and Colposcopy History:  4/26/24 ASCUS. HPV +. STD Negative..    Procedure:  The patient is counseled regarding the risks of coloposcopy, including bleeding, infection at the biopsy site or endometrium, and failure to identify the lesion.  Patient was placed in the lithotomy position.  The vulva was examined for suspicious lesions.  Subsequently a speculum was inserted and the cervix and vaginal wall were visualized.  3% Acetic acid was applied to the cervix using cotton swabs and the area was further visualized.  Abnormal areas were identified  and biopsies were obtained at 5 o'clock position.    Silver nitrate was used to control bleeding and good hemostasis was obtained.     Adequate Procedure: Yes    ECC Performed Yes    Additional lesion identified: No     Summary:  ZORA 1    Plan:   Follow up pathology.      Di Salazar MD  [facility]

## 2024-06-26 NOTE — PROGRESS NOTES
PLEASE CONTINUE TAKING ALL PRESCRIPTION MEDICATIONS UP TO THE DAY OF SURGERY UNLESS OTHERWISE DIRECTED BELOW. You may take Tylenol, allergy,  and/or indigestion medications.     TAKE ONLY THESE MEDICATIONS ON THE DAY OF SURGERY      Abilify     Keep insulin pump at basal rate for surgery        DISCONTINUE all vitamins and supplements 7 days prior to surgery. DISCONTINUE Non-Steroidal Anti-Inflammatory (NSAIDS) such as Advil and Aleve 5 days prior to surgery.     Home Medications to Hold- please continue all other medications except these.            Comments      On the day before surgery please take 2 Tylenol in the morning and then again before bed. You may use either regular or extra strength.           Please do not bring home medications with you on the day of surgery unless otherwise directed by your nurse.  If you are instructed to bring home medications, please give them to your nurse as they will be administered by the nursing staff.    If you have any questions, please call [facility] [phone].    A copy of this note was provided to the patient for reference.

## 2024-06-27 ENCOUNTER — CLINICAL DOCUMENTATION (OUTPATIENT)
Dept: OBGYN CLINIC | Age: 29
End: 2024-06-27

## 2024-07-02 ENCOUNTER — CLINICAL DOCUMENTATION (OUTPATIENT)
Dept: SURGERY | Age: 29
End: 2024-07-02

## 2024-07-02 NOTE — PROGRESS NOTES
Enhanced Recovery After GYN Surgery: Diabetic patients with neuropathy or other contraindications    It is highly recommended you purchase and drink Glucerna- one bottle twice daily for five days beginning on 7/13/24 . Stopping two days prior to surgery 7/17/24.  Do not drink any Glucerna the day  before surgery. It is recommended that you continue drinking this for one month after surgery if ok with your physician.    Follow your surgeon's instructions regarding diet in the days leading up to your surgery  and regarding any bowel preparation.     The night before surgery 7/18/24, drink two bottles of the Ensure Pre-Surgery drink.    Bring your patient handbook with you to the hospital.      Things to remember:    1. You will be given clear liquids to drink, advancing diet as tolerated    2. You will be up and moving around with assistance 2-4 hours after surgery.    3. You will be given regularly scheduled pain medications (NSAIDS, Tylenol) with narcotics as needed.    4. You may be able to go home that night if the surgeon okays and you are up and eating and drinking. Otherwise, your discharge will be the following morning around lunch time.     5. Continue drinking Glucerna for 5 days after surgery.

## 2024-07-03 ENCOUNTER — HOSPITAL ENCOUNTER (OUTPATIENT)
Dept: SURGERY | Age: 29
Discharge: HOME | End: 2024-07-06
Payer: COMMERCIAL

## 2024-07-03 DIAGNOSIS — Z01.818 PRE-OP TESTING: ICD-10-CM

## 2024-07-03 LAB — HGB BLD-MCNC: 13.6 G/DL (ref 11.7–15.4)

## 2024-07-03 PROCEDURE — 36415 COLL VENOUS BLD VENIPUNCTURE: CPT

## 2024-07-03 PROCEDURE — 85018 HEMOGLOBIN: CPT

## 2024-07-03 NOTE — NURSE NAVIGATOR
Patient attended ERAS/ GYN surgery orientation class today.  Detailed instruction book regarding GYN surgery was provided at start of class. Class content included pre-operative instructions for surgery in the week prior to and day before surgery. Packet including Hibiclens and printed instructions on bathing was provided to patient. Detailed and printed diet instruction and presurgical drinks were also given to patient  in accordance with ERAS protocol. Detailed information was given regarding arriving at the hospital and instructions for the patient's day of surgery.  Discussed recovery from surgery, hospital stay, pain management, and discharge.  Reviewed recovery at home including pelvic rest, driving and activity restrictions, issues requiring call to physician etc. Answered all questions in detail.  Patient voices understanding of all.        Detailed diet and ERAS drink instructions discussed with patient after class in regards to her diabetes and gastroparesis.  Patient voiced understanding and questions were answered.

## 2024-07-09 ASSESSMENT — ENCOUNTER SYMPTOMS
DIARRHEA: 0
CONSTIPATION: 0

## 2024-07-09 NOTE — PROGRESS NOTES
[facility], APRN-[facility]  [address], [address]  [place] [address]            Negin Pham is seen today for follow up of type 1 diabetes mellitus.      ASSESSMENT AND PLAN:    Interpretation of 72 hour glucose monitor:  At least 72 hours of data were reviewed.  The patient utilizes a Dexcom G6 continuous glucose monitoring system.  The average glucose during the reviewed timeframe was 171 with a coefficient of variation of 45.3% (time in range 60%, time above range 37%, time below range 3%).     1. Type 1 diabetes mellitus with hyperglycemia (HCC)  A1c 6.8%, GMI: 7.4%. Glycemic control sub-optimal with A1c at goal of less than 7%.  Made correction factor more aggressive. No other changes. We discussed using extended bolusing and she will give that a try.   Patient is overdue for her annual diabetic eye exam. Counseled on optimizing glycemic control, blood pressure and cholesterol to reduce risk or slow progression of diabetic retinopathy. Ophthalmology referral placed.    - AMB POC HEMOGLOBIN A1C  - CT CONTINUOUS GLUCOSE MONITORING ANALYSIS I&R  - Continuous Glucose Sensor (DEXCOM G7 SENSOR) MISC; Change every 10 days to monitor blood glucose, E10.65  Dispense: 9 each; Refill: 3  - AFL - [facility]    2. Insulin pump status  Pump setting changes as below. Long-acting insulin on chart for pump failure.    - Insulin Infusion Pump ISABELLA; Basal Rates 0000: 0.75, 0300: 0.80, 0900: 1.00, 1200: 1.20, 2100: 1.05  Carb Ratio 0000: 15, 1200: 12, 1700: 15, 2100: 12  Correction Factor: 0000: 66  Targets  0000: 100  Active Insulin time 3 hrs  Max Bolus 20 units  Control IQ: weight: 127; TDD: 37  Dispense: 1 each; Refill: 0        Return in about 3 months (around 10/11/2024).     History of Present Illness:    Interim medical updates: having a hysterectomy next week. Has been having trouble with G6 and is ready to transition to G7    Barriers to care: none identified    Diabetes

## 2024-07-10 ENCOUNTER — OFFICE VISIT (OUTPATIENT)
Dept: OBGYN CLINIC | Age: 29
End: 2024-07-10

## 2024-07-10 VITALS
BODY MASS INDEX: 19.41 KG/M2 | DIASTOLIC BLOOD PRESSURE: 70 MMHG | HEIGHT: 62 IN | WEIGHT: 105.5 LBS | SYSTOLIC BLOOD PRESSURE: 118 MMHG

## 2024-07-10 DIAGNOSIS — Z01.818 PRE-OP EXAMINATION: ICD-10-CM

## 2024-07-10 DIAGNOSIS — N94.6 DYSMENORRHEA: Primary | ICD-10-CM

## 2024-07-10 RX ORDER — IBUPROFEN 800 MG/1
800 TABLET ORAL EVERY 8 HOURS PRN
Qty: 90 TABLET | Refills: 0 | Status: SHIPPED | OUTPATIENT
Start: 2024-07-10

## 2024-07-10 RX ORDER — OXYCODONE HYDROCHLORIDE 5 MG/1
5 TABLET ORAL EVERY 6 HOURS PRN
Qty: 20 TABLET | Refills: 0 | Status: SHIPPED | OUTPATIENT
Start: 2024-07-10 | End: 2024-07-17

## 2024-07-10 RX ORDER — PROMETHAZINE HYDROCHLORIDE 25 MG/1
25 TABLET ORAL 4 TIMES DAILY PRN
Qty: 20 TABLET | Refills: 0 | Status: SHIPPED | OUTPATIENT
Start: 2024-07-10 | End: 2024-07-17

## 2024-07-10 NOTE — PROGRESS NOTES
Preop Visit    Ms. Negin Pham presents for a preop visit.  She is scheduled for a ERAS/ HYSTERECTOMY ABDOMINAL LAPAROSCOPIC ROBOTIC. Her history, meds, and allergies were reviewed.  The procedure was reviewed in detail as well as the risks of bleeding, infection, DVT and potential surgical complications involving the bladder, ureters, colon or intestines.  Also the alternatives,  benefits, recovery and follow-up. Prevention of SSI discussed as indicated.  All of her questions were answered.    Exam:  Lungs CTAB  Heart RRR    See the hospital H&P as well as prior chart for details.

## 2024-07-11 ENCOUNTER — OFFICE VISIT (OUTPATIENT)
Dept: ENDOCRINOLOGY | Age: 29
End: 2024-07-11
Payer: COMMERCIAL

## 2024-07-11 VITALS
WEIGHT: 104 LBS | SYSTOLIC BLOOD PRESSURE: 102 MMHG | BODY MASS INDEX: 19.02 KG/M2 | HEART RATE: 96 BPM | OXYGEN SATURATION: 98 % | DIASTOLIC BLOOD PRESSURE: 62 MMHG

## 2024-07-11 DIAGNOSIS — Z96.41 INSULIN PUMP STATUS: Chronic | ICD-10-CM

## 2024-07-11 DIAGNOSIS — E10.65 TYPE 1 DIABETES MELLITUS WITH HYPERGLYCEMIA (HCC): Primary | ICD-10-CM

## 2024-07-11 PROBLEM — I95.1 DYSAUTONOMIA ORTHOSTATIC HYPOTENSION SYNDROME: Status: ACTIVE | Noted: 2022-01-17

## 2024-07-11 PROCEDURE — G8420 CALC BMI NORM PARAMETERS: HCPCS | Performed by: NURSE PRACTITIONER

## 2024-07-11 PROCEDURE — 3046F HEMOGLOBIN A1C LEVEL >9.0%: CPT | Performed by: NURSE PRACTITIONER

## 2024-07-11 PROCEDURE — 99214 OFFICE O/P EST MOD 30 MIN: CPT | Performed by: NURSE PRACTITIONER

## 2024-07-11 PROCEDURE — 95251 CONT GLUC MNTR ANALYSIS I&R: CPT | Performed by: NURSE PRACTITIONER

## 2024-07-11 PROCEDURE — 2022F DILAT RTA XM EVC RTNOPTHY: CPT | Performed by: NURSE PRACTITIONER

## 2024-07-11 PROCEDURE — 1036F TOBACCO NON-USER: CPT | Performed by: NURSE PRACTITIONER

## 2024-07-11 PROCEDURE — G8427 DOCREV CUR MEDS BY ELIG CLIN: HCPCS | Performed by: NURSE PRACTITIONER

## 2024-07-11 RX ORDER — ACYCLOVIR 400 MG/1
TABLET ORAL
Qty: 9 EACH | Refills: 3
Start: 2024-07-11

## 2024-07-12 RX ORDER — SODIUM CHLORIDE 0.9 % (FLUSH) 0.9 %
5-40 SYRINGE (ML) INJECTION EVERY 12 HOURS SCHEDULED
Status: CANCELLED | OUTPATIENT
Start: 2024-07-12

## 2024-07-12 RX ORDER — SODIUM CHLORIDE 0.9 % (FLUSH) 0.9 %
5-40 SYRINGE (ML) INJECTION PRN
Status: CANCELLED | OUTPATIENT
Start: 2024-07-12

## 2024-07-12 RX ORDER — SODIUM CHLORIDE 9 MG/ML
INJECTION, SOLUTION INTRAVENOUS PRN
Status: CANCELLED | OUTPATIENT
Start: 2024-07-12

## 2024-07-12 NOTE — H&P (VIEW-ONLY)
Gynecology History and Physical    Name: Negin Pham MRN: [id number] SSN: [id number]    YOB: 1995  Age: 29 y.o.  Sex: female       Subjective:      Chief complaint:  Dysmenorrhea    Negin is a 29 y.o.  female with a history of dysmenorrhea.     The current method of family planning is intrauterine device.    OB History          2    Para   2    Term           2    AB        Living   2         SAB        IAB        Ectopic        Molar        Multiple        Live Births   2              Past Medical History:   Diagnosis Date    ADHD (attention deficit hyperactivity disorder), inattentive type     Bipolar 2 disorder (HCC)     Borderline personality disorder (HCC)     Chronic pain syndrome     Diabetes (HCC)     Type 1, Insulin pump, dexacom, Average fasting , Hypo- no longer able to tell when low, last A1C 7.0 in 2024    Gastroparesis     GERD (gastroesophageal reflux disease)     Daily meds    High cholesterol     History of blood transfusion     Hypothyroid     Lymphadenopathy     Orthostatic hypotension     Followed by [facility] cardiology    PTSD (post-traumatic stress disorder)     Syncope     unspecified, Pt states with orthostatic hypotension     Past Surgical History:   Procedure Laterality Date    CARPAL TUNNEL RELEASE Right     COLONOSCOPY N/A 2024    COLORECTAL CANCER SCREENING, NOT HIGH RISK performed by Anthony Ackerman MD at [facility] ENDOSCOPY    SALPINGECTOMY Bilateral     TUMOR REMOVAL      UPPER GASTROINTESTINAL ENDOSCOPY N/A 2024    ESOPHAGOGASTRODUODENOSCOPY BIOPSY performed by Anthony Ackerman MD at [facility] ENDOSCOPY    VAGINAL SEPTUM RESECTION       Social History     Occupational History    Not on file   Tobacco Use    Smoking status: Former     Types: Cigarettes     Start date:      Quit date:      Years since quittin.5     Passive exposure: Past    Smokeless tobacco: Never   Vaping Use    Vaping Use: Never used

## 2024-07-16 ENCOUNTER — CLINICAL DOCUMENTATION (OUTPATIENT)
Dept: OBGYN CLINIC | Age: 29
End: 2024-07-16

## 2024-07-16 ENCOUNTER — TELEPHONE (OUTPATIENT)
Dept: ENDOCRINOLOGY | Age: 29
End: 2024-07-16

## 2024-07-16 NOTE — TELEPHONE ENCOUNTER
Nidia Cohen (Diabetes)  3m ago  accepted order successfully. #[id number]  Nidia Cohen (Diabetes)  4m ago  Hello! Thank you for placing your order with [facility]. This order is now being processed. We will confirm insurance benefits, and reach out to the patient to confirm the order if there is any estimated balance due prior to shipping. We will keep you informed with status updates as the order progresses. Thank you for choosing [facility]

## 2024-07-18 ENCOUNTER — ANESTHESIA EVENT (OUTPATIENT)
Dept: SURGERY | Age: 29
End: 2024-07-18
Payer: COMMERCIAL

## 2024-07-19 ENCOUNTER — PREP FOR PROCEDURE (OUTPATIENT)
Dept: OBGYN CLINIC | Age: 29
End: 2024-07-19

## 2024-07-19 ENCOUNTER — HOSPITAL ENCOUNTER (OUTPATIENT)
Age: 29
Setting detail: OUTPATIENT SURGERY
Discharge: HOME | End: 2024-07-19
Attending: STUDENT IN AN ORGANIZED HEALTH CARE EDUCATION/TRAINING PROGRAM | Admitting: STUDENT IN AN ORGANIZED HEALTH CARE EDUCATION/TRAINING PROGRAM
Payer: COMMERCIAL

## 2024-07-19 ENCOUNTER — ANESTHESIA (OUTPATIENT)
Dept: SURGERY | Age: 29
End: 2024-07-19
Payer: COMMERCIAL

## 2024-07-19 VITALS
SYSTOLIC BLOOD PRESSURE: 104 MMHG | OXYGEN SATURATION: 97 % | RESPIRATION RATE: 20 BRPM | BODY MASS INDEX: 20.19 KG/M2 | TEMPERATURE: 98.2 F | HEART RATE: 76 BPM | WEIGHT: 109.7 LBS | HEIGHT: 62 IN | DIASTOLIC BLOOD PRESSURE: 69 MMHG

## 2024-07-19 DIAGNOSIS — Z01.818 PRE-OP TESTING: Primary | ICD-10-CM

## 2024-07-19 DIAGNOSIS — N94.6 DYSMENORRHEA: Primary | ICD-10-CM

## 2024-07-19 LAB
ABO + RH BLD: NORMAL
BLOOD GROUP ANTIBODIES SERPL: NORMAL
ERYTHROCYTE [DISTWIDTH] IN BLOOD BY AUTOMATED COUNT: 13.2 % (ref 11.9–14.6)
GLUCOSE BLD STRIP.AUTO-MCNC: 132 MG/DL (ref 65–100)
GLUCOSE BLD STRIP.AUTO-MCNC: 180 MG/DL (ref 65–100)
GLUCOSE BLD STRIP.AUTO-MCNC: 224 MG/DL (ref 65–100)
HCG UR QL: NEGATIVE
HCT VFR BLD AUTO: 42.1 % (ref 35.8–46.3)
HGB BLD-MCNC: 13.8 G/DL (ref 11.7–15.4)
HPV APTIMA: POSITIVE
MCH RBC QN AUTO: 28.7 PG (ref 26.1–32.9)
MCHC RBC AUTO-ENTMCNC: 32.8 G/DL (ref 31.4–35)
MCV RBC AUTO: 87.5 FL (ref 82–102)
NRBC # BLD: 0 K/UL (ref 0–0.2)
PLATELET # BLD AUTO: 251 K/UL (ref 150–450)
PMV BLD AUTO: 11.7 FL (ref 9.4–12.3)
RBC # BLD AUTO: 4.81 M/UL (ref 4.05–5.2)
SERVICE CMNT-IMP: ABNORMAL
SPECIMEN EXP DATE BLD: NORMAL
SPECIMEN SOURCE: ABNORMAL
WBC # BLD AUTO: 5.4 K/UL (ref 4.3–11.1)

## 2024-07-19 PROCEDURE — 6360000002 HC RX W HCPCS: Performed by: NURSE ANESTHETIST, CERTIFIED REGISTERED

## 2024-07-19 PROCEDURE — 88307 TISSUE EXAM BY PATHOLOGIST: CPT

## 2024-07-19 PROCEDURE — 86901 BLOOD TYPING SEROLOGIC RH(D): CPT

## 2024-07-19 PROCEDURE — 7100000011 HC PHASE II RECOVERY - ADDTL 15 MIN: Performed by: STUDENT IN AN ORGANIZED HEALTH CARE EDUCATION/TRAINING PROGRAM

## 2024-07-19 PROCEDURE — 3600000009 HC SURGERY ROBOT BASE: Performed by: STUDENT IN AN ORGANIZED HEALTH CARE EDUCATION/TRAINING PROGRAM

## 2024-07-19 PROCEDURE — 2500000003 HC RX 250 WO HCPCS: Performed by: ANESTHESIOLOGY

## 2024-07-19 PROCEDURE — 6360000002 HC RX W HCPCS: Performed by: ANESTHESIOLOGY

## 2024-07-19 PROCEDURE — 3700000000 HC ANESTHESIA ATTENDED CARE: Performed by: STUDENT IN AN ORGANIZED HEALTH CARE EDUCATION/TRAINING PROGRAM

## 2024-07-19 PROCEDURE — C1765 ADHESION BARRIER: HCPCS | Performed by: STUDENT IN AN ORGANIZED HEALTH CARE EDUCATION/TRAINING PROGRAM

## 2024-07-19 PROCEDURE — 3700000001 HC ADD 15 MINUTES (ANESTHESIA): Performed by: STUDENT IN AN ORGANIZED HEALTH CARE EDUCATION/TRAINING PROGRAM

## 2024-07-19 PROCEDURE — 82962 GLUCOSE BLOOD TEST: CPT

## 2024-07-19 PROCEDURE — 86900 BLOOD TYPING SEROLOGIC ABO: CPT

## 2024-07-19 PROCEDURE — 6360000002 HC RX W HCPCS: Performed by: STUDENT IN AN ORGANIZED HEALTH CARE EDUCATION/TRAINING PROGRAM

## 2024-07-19 PROCEDURE — 2720000010 HC SURG SUPPLY STERILE: Performed by: STUDENT IN AN ORGANIZED HEALTH CARE EDUCATION/TRAINING PROGRAM

## 2024-07-19 PROCEDURE — S2900 ROBOTIC SURGICAL SYSTEM: HCPCS | Performed by: STUDENT IN AN ORGANIZED HEALTH CARE EDUCATION/TRAINING PROGRAM

## 2024-07-19 PROCEDURE — 6370000000 HC RX 637 (ALT 250 FOR IP): Performed by: ANESTHESIOLOGY

## 2024-07-19 PROCEDURE — 81025 URINE PREGNANCY TEST: CPT

## 2024-07-19 PROCEDURE — 7100000000 HC PACU RECOVERY - FIRST 15 MIN: Performed by: STUDENT IN AN ORGANIZED HEALTH CARE EDUCATION/TRAINING PROGRAM

## 2024-07-19 PROCEDURE — 85027 COMPLETE CBC AUTOMATED: CPT

## 2024-07-19 PROCEDURE — 7100000010 HC PHASE II RECOVERY - FIRST 15 MIN: Performed by: STUDENT IN AN ORGANIZED HEALTH CARE EDUCATION/TRAINING PROGRAM

## 2024-07-19 PROCEDURE — 2500000003 HC RX 250 WO HCPCS: Performed by: NURSE ANESTHETIST, CERTIFIED REGISTERED

## 2024-07-19 PROCEDURE — 2709999900 HC NON-CHARGEABLE SUPPLY: Performed by: STUDENT IN AN ORGANIZED HEALTH CARE EDUCATION/TRAINING PROGRAM

## 2024-07-19 PROCEDURE — 2580000003 HC RX 258: Performed by: ANESTHESIOLOGY

## 2024-07-19 PROCEDURE — 58570 TLH UTERUS 250 G OR LESS: CPT | Performed by: STUDENT IN AN ORGANIZED HEALTH CARE EDUCATION/TRAINING PROGRAM

## 2024-07-19 PROCEDURE — 86850 RBC ANTIBODY SCREEN: CPT

## 2024-07-19 PROCEDURE — 3600000019 HC SURGERY ROBOT ADDTL 15MIN: Performed by: STUDENT IN AN ORGANIZED HEALTH CARE EDUCATION/TRAINING PROGRAM

## 2024-07-19 PROCEDURE — 7100000001 HC PACU RECOVERY - ADDTL 15 MIN: Performed by: STUDENT IN AN ORGANIZED HEALTH CARE EDUCATION/TRAINING PROGRAM

## 2024-07-19 RX ORDER — ONDANSETRON 2 MG/ML
4 INJECTION INTRAMUSCULAR; INTRAVENOUS EVERY 6 HOURS PRN
Status: CANCELLED | OUTPATIENT
Start: 2024-07-19

## 2024-07-19 RX ORDER — ONDANSETRON 2 MG/ML
4 INJECTION INTRAMUSCULAR; INTRAVENOUS
Status: COMPLETED | OUTPATIENT
Start: 2024-07-19 | End: 2024-07-19

## 2024-07-19 RX ORDER — GLYCOPYRROLATE 0.2 MG/ML
INJECTION INTRAMUSCULAR; INTRAVENOUS PRN
Status: DISCONTINUED | OUTPATIENT
Start: 2024-07-19 | End: 2024-07-19 | Stop reason: SDUPTHER

## 2024-07-19 RX ORDER — IBUPROFEN 800 MG/1
800 TABLET ORAL EVERY 8 HOURS
Status: CANCELLED | OUTPATIENT
Start: 2024-07-20

## 2024-07-19 RX ORDER — PROCHLORPERAZINE EDISYLATE 5 MG/ML
5 INJECTION INTRAMUSCULAR; INTRAVENOUS
Status: DISCONTINUED | OUTPATIENT
Start: 2024-07-19 | End: 2024-07-19 | Stop reason: HOSPADM

## 2024-07-19 RX ORDER — SODIUM CHLORIDE 0.9 % (FLUSH) 0.9 %
5-40 SYRINGE (ML) INJECTION EVERY 12 HOURS SCHEDULED
Status: DISCONTINUED | OUTPATIENT
Start: 2024-07-19 | End: 2024-07-19 | Stop reason: HOSPADM

## 2024-07-19 RX ORDER — SODIUM CHLORIDE 0.9 % (FLUSH) 0.9 %
5-40 SYRINGE (ML) INJECTION PRN
Status: DISCONTINUED | OUTPATIENT
Start: 2024-07-19 | End: 2024-07-19 | Stop reason: HOSPADM

## 2024-07-19 RX ORDER — DIPHENHYDRAMINE HYDROCHLORIDE 50 MG/ML
12.5 INJECTION INTRAMUSCULAR; INTRAVENOUS
Status: DISCONTINUED | OUTPATIENT
Start: 2024-07-19 | End: 2024-07-19 | Stop reason: HOSPADM

## 2024-07-19 RX ORDER — SODIUM CHLORIDE 9 MG/ML
INJECTION, SOLUTION INTRAVENOUS PRN
Status: DISCONTINUED | OUTPATIENT
Start: 2024-07-19 | End: 2024-07-19 | Stop reason: HOSPADM

## 2024-07-19 RX ORDER — FENTANYL CITRATE 50 UG/ML
25 INJECTION, SOLUTION INTRAMUSCULAR; INTRAVENOUS EVERY 5 MIN PRN
Status: DISCONTINUED | OUTPATIENT
Start: 2024-07-19 | End: 2024-07-19 | Stop reason: HOSPADM

## 2024-07-19 RX ORDER — MEPERIDINE HYDROCHLORIDE 50 MG/ML
12.5 INJECTION INTRAMUSCULAR; INTRAVENOUS; SUBCUTANEOUS EVERY 5 MIN PRN
Status: DISCONTINUED | OUTPATIENT
Start: 2024-07-19 | End: 2024-07-19 | Stop reason: HOSPADM

## 2024-07-19 RX ORDER — ARIPIPRAZOLE 5 MG/1
5 TABLET ORAL DAILY
Status: CANCELLED | OUTPATIENT
Start: 2024-07-20

## 2024-07-19 RX ORDER — OXYCODONE HYDROCHLORIDE 5 MG/1
10 TABLET ORAL EVERY 4 HOURS PRN
Status: CANCELLED | OUTPATIENT
Start: 2024-07-19

## 2024-07-19 RX ORDER — IBUPROFEN 600 MG/1
1 TABLET ORAL PRN
Status: DISCONTINUED | OUTPATIENT
Start: 2024-07-19 | End: 2024-07-19 | Stop reason: HOSPADM

## 2024-07-19 RX ORDER — OXYCODONE HYDROCHLORIDE 5 MG/1
5 TABLET ORAL
Status: COMPLETED | OUTPATIENT
Start: 2024-07-19 | End: 2024-07-19

## 2024-07-19 RX ORDER — MIDAZOLAM HYDROCHLORIDE 1 MG/ML
INJECTION INTRAMUSCULAR; INTRAVENOUS PRN
Status: DISCONTINUED | OUTPATIENT
Start: 2024-07-19 | End: 2024-07-19 | Stop reason: SDUPTHER

## 2024-07-19 RX ORDER — KETOROLAC TROMETHAMINE 15 MG/ML
15 INJECTION, SOLUTION INTRAMUSCULAR; INTRAVENOUS EVERY 6 HOURS
Status: CANCELLED | OUTPATIENT
Start: 2024-07-19 | End: 2024-07-20

## 2024-07-19 RX ORDER — NEOSTIGMINE METHYLSULFATE 1 MG/ML
INJECTION, SOLUTION INTRAVENOUS PRN
Status: DISCONTINUED | OUTPATIENT
Start: 2024-07-19 | End: 2024-07-19 | Stop reason: SDUPTHER

## 2024-07-19 RX ORDER — ACETAMINOPHEN 500 MG
1000 TABLET ORAL ONCE
Status: DISCONTINUED | OUTPATIENT
Start: 2024-07-19 | End: 2024-07-19 | Stop reason: HOSPADM

## 2024-07-19 RX ORDER — SUCCINYLCHOLINE/SOD CL,ISO/PF 200MG/10ML
SYRINGE (ML) INTRAVENOUS PRN
Status: DISCONTINUED | OUTPATIENT
Start: 2024-07-19 | End: 2024-07-19 | Stop reason: SDUPTHER

## 2024-07-19 RX ORDER — DEXAMETHASONE SODIUM PHOSPHATE 10 MG/ML
INJECTION INTRAMUSCULAR; INTRAVENOUS PRN
Status: DISCONTINUED | OUTPATIENT
Start: 2024-07-19 | End: 2024-07-19 | Stop reason: SDUPTHER

## 2024-07-19 RX ORDER — ROCURONIUM BROMIDE 10 MG/ML
INJECTION, SOLUTION INTRAVENOUS PRN
Status: DISCONTINUED | OUTPATIENT
Start: 2024-07-19 | End: 2024-07-19 | Stop reason: SDUPTHER

## 2024-07-19 RX ORDER — TRAZODONE HYDROCHLORIDE 50 MG/1
50 TABLET ORAL NIGHTLY
Status: CANCELLED | OUTPATIENT
Start: 2024-07-19

## 2024-07-19 RX ORDER — MIDAZOLAM HYDROCHLORIDE 2 MG/2ML
2 INJECTION, SOLUTION INTRAMUSCULAR; INTRAVENOUS
Status: DISCONTINUED | OUTPATIENT
Start: 2024-07-19 | End: 2024-07-19 | Stop reason: HOSPADM

## 2024-07-19 RX ORDER — KETOROLAC TROMETHAMINE 30 MG/ML
INJECTION, SOLUTION INTRAMUSCULAR; INTRAVENOUS PRN
Status: DISCONTINUED | OUTPATIENT
Start: 2024-07-19 | End: 2024-07-19 | Stop reason: SDUPTHER

## 2024-07-19 RX ORDER — OXYCODONE HYDROCHLORIDE 5 MG/1
5 TABLET ORAL EVERY 4 HOURS PRN
Status: CANCELLED | OUTPATIENT
Start: 2024-07-19

## 2024-07-19 RX ORDER — DEXTROSE MONOHYDRATE 100 MG/ML
INJECTION, SOLUTION INTRAVENOUS CONTINUOUS PRN
Status: DISCONTINUED | OUTPATIENT
Start: 2024-07-19 | End: 2024-07-19 | Stop reason: HOSPADM

## 2024-07-19 RX ORDER — NALOXONE HYDROCHLORIDE 0.4 MG/ML
INJECTION, SOLUTION INTRAMUSCULAR; INTRAVENOUS; SUBCUTANEOUS PRN
Status: DISCONTINUED | OUTPATIENT
Start: 2024-07-19 | End: 2024-07-19 | Stop reason: HOSPADM

## 2024-07-19 RX ORDER — SODIUM CHLORIDE, SODIUM LACTATE, POTASSIUM CHLORIDE, CALCIUM CHLORIDE 600; 310; 30; 20 MG/100ML; MG/100ML; MG/100ML; MG/100ML
INJECTION, SOLUTION INTRAVENOUS CONTINUOUS
Status: DISCONTINUED | OUTPATIENT
Start: 2024-07-19 | End: 2024-07-19 | Stop reason: HOSPADM

## 2024-07-19 RX ORDER — LIDOCAINE HYDROCHLORIDE 10 MG/ML
1 INJECTION, SOLUTION INFILTRATION; PERINEURAL
Status: COMPLETED | OUTPATIENT
Start: 2024-07-19 | End: 2024-07-19

## 2024-07-19 RX ORDER — PROPOFOL 10 MG/ML
INJECTION, EMULSION INTRAVENOUS PRN
Status: DISCONTINUED | OUTPATIENT
Start: 2024-07-19 | End: 2024-07-19 | Stop reason: SDUPTHER

## 2024-07-19 RX ORDER — FENTANYL CITRATE 50 UG/ML
INJECTION, SOLUTION INTRAMUSCULAR; INTRAVENOUS PRN
Status: DISCONTINUED | OUTPATIENT
Start: 2024-07-19 | End: 2024-07-19 | Stop reason: SDUPTHER

## 2024-07-19 RX ORDER — SODIUM CHLORIDE 0.9 % (FLUSH) 0.9 %
5-40 SYRINGE (ML) INJECTION PRN
Status: CANCELLED | OUTPATIENT
Start: 2024-07-19

## 2024-07-19 RX ORDER — ONDANSETRON 4 MG/1
4 TABLET, ORALLY DISINTEGRATING ORAL EVERY 8 HOURS PRN
Status: CANCELLED | OUTPATIENT
Start: 2024-07-19

## 2024-07-19 RX ORDER — LIDOCAINE HYDROCHLORIDE 20 MG/ML
INJECTION, SOLUTION EPIDURAL; INFILTRATION; INTRACAUDAL; PERINEURAL PRN
Status: DISCONTINUED | OUTPATIENT
Start: 2024-07-19 | End: 2024-07-19 | Stop reason: SDUPTHER

## 2024-07-19 RX ORDER — ONDANSETRON 2 MG/ML
INJECTION INTRAMUSCULAR; INTRAVENOUS PRN
Status: DISCONTINUED | OUTPATIENT
Start: 2024-07-19 | End: 2024-07-19 | Stop reason: SDUPTHER

## 2024-07-19 RX ORDER — SODIUM CHLORIDE 0.9 % (FLUSH) 0.9 %
5-40 SYRINGE (ML) INJECTION EVERY 12 HOURS SCHEDULED
Status: CANCELLED | OUTPATIENT
Start: 2024-07-19

## 2024-07-19 RX ORDER — KETAMINE HCL IN NACL, ISO-OSM 20 MG/2 ML
SYRINGE (ML) INJECTION PRN
Status: DISCONTINUED | OUTPATIENT
Start: 2024-07-19 | End: 2024-07-19 | Stop reason: SDUPTHER

## 2024-07-19 RX ADMIN — Medication 10 MG: at 08:01

## 2024-07-19 RX ADMIN — DEXAMETHASONE SODIUM PHOSPHATE 4 MG: 10 INJECTION INTRAMUSCULAR; INTRAVENOUS at 07:23

## 2024-07-19 RX ADMIN — KETOROLAC TROMETHAMINE 30 MG: 30 INJECTION, SOLUTION INTRAMUSCULAR at 09:21

## 2024-07-19 RX ADMIN — MIDAZOLAM 2 MG: 1 INJECTION INTRAMUSCULAR; INTRAVENOUS at 07:03

## 2024-07-19 RX ADMIN — SODIUM CHLORIDE, SODIUM LACTATE, POTASSIUM CHLORIDE, AND CALCIUM CHLORIDE: 600; 310; 30; 20 INJECTION, SOLUTION INTRAVENOUS at 09:19

## 2024-07-19 RX ADMIN — LIDOCAINE HYDROCHLORIDE 60 MG: 20 INJECTION, SOLUTION EPIDURAL; INFILTRATION; INTRACAUDAL; PERINEURAL at 07:17

## 2024-07-19 RX ADMIN — FENTANYL CITRATE 25 MCG: 50 INJECTION, SOLUTION INTRAMUSCULAR; INTRAVENOUS at 07:17

## 2024-07-19 RX ADMIN — OXYCODONE HYDROCHLORIDE 5 MG: 5 TABLET ORAL at 11:12

## 2024-07-19 RX ADMIN — Medication 2000 MG: at 07:29

## 2024-07-19 RX ADMIN — FENTANYL CITRATE 50 MCG: 50 INJECTION, SOLUTION INTRAMUSCULAR; INTRAVENOUS at 07:51

## 2024-07-19 RX ADMIN — LIDOCAINE HYDROCHLORIDE 1 ML: 10 INJECTION, SOLUTION INFILTRATION; PERINEURAL at 06:51

## 2024-07-19 RX ADMIN — Medication 10 MG: at 08:41

## 2024-07-19 RX ADMIN — FENTANYL CITRATE 25 MCG: 50 INJECTION, SOLUTION INTRAMUSCULAR; INTRAVENOUS at 07:24

## 2024-07-19 RX ADMIN — Medication 20 MG: at 07:17

## 2024-07-19 RX ADMIN — ONDANSETRON 4 MG: 2 INJECTION INTRAMUSCULAR; INTRAVENOUS at 10:25

## 2024-07-19 RX ADMIN — SODIUM CHLORIDE, SODIUM LACTATE, POTASSIUM CHLORIDE, AND CALCIUM CHLORIDE 125 ML: 600; 310; 30; 20 INJECTION, SOLUTION INTRAVENOUS at 06:50

## 2024-07-19 RX ADMIN — HYDROMORPHONE HYDROCHLORIDE 0.5 MG: 1 INJECTION, SOLUTION INTRAMUSCULAR; INTRAVENOUS; SUBCUTANEOUS at 10:05

## 2024-07-19 RX ADMIN — Medication 80 MG: at 07:17

## 2024-07-19 RX ADMIN — FENTANYL CITRATE 50 MCG: 50 INJECTION, SOLUTION INTRAMUSCULAR; INTRAVENOUS at 09:44

## 2024-07-19 RX ADMIN — HYDROMORPHONE HYDROCHLORIDE 0.5 MG: 1 INJECTION, SOLUTION INTRAMUSCULAR; INTRAVENOUS; SUBCUTANEOUS at 09:58

## 2024-07-19 RX ADMIN — GLYCOPYRROLATE 0.4 MG: 0.2 INJECTION INTRAMUSCULAR; INTRAVENOUS at 09:18

## 2024-07-19 RX ADMIN — ROCURONIUM BROMIDE 30 MG: 10 INJECTION, SOLUTION INTRAVENOUS at 07:24

## 2024-07-19 RX ADMIN — FENTANYL CITRATE 50 MCG: 50 INJECTION, SOLUTION INTRAMUSCULAR; INTRAVENOUS at 09:39

## 2024-07-19 RX ADMIN — ONDANSETRON 4 MG: 2 INJECTION INTRAMUSCULAR; INTRAVENOUS at 09:00

## 2024-07-19 RX ADMIN — Medication 3 MG: at 09:18

## 2024-07-19 RX ADMIN — PROPOFOL 110 MG: 10 INJECTION, EMULSION INTRAVENOUS at 07:17

## 2024-07-19 RX ADMIN — ROCURONIUM BROMIDE 10 MG: 10 INJECTION, SOLUTION INTRAVENOUS at 08:03

## 2024-07-19 ASSESSMENT — PAIN DESCRIPTION - LOCATION
LOCATION: ABDOMEN

## 2024-07-19 ASSESSMENT — PAIN DESCRIPTION - ORIENTATION
ORIENTATION: LOWER

## 2024-07-19 ASSESSMENT — PAIN SCALES - GENERAL
PAINLEVEL_OUTOF10: 2
PAINLEVEL_OUTOF10: 2
PAINLEVEL_OUTOF10: 4
PAINLEVEL_OUTOF10: 6
PAINLEVEL_OUTOF10: 4
PAINLEVEL_OUTOF10: 7
PAINLEVEL_OUTOF10: 7

## 2024-07-19 ASSESSMENT — PAIN DESCRIPTION - DESCRIPTORS
DESCRIPTORS: BURNING;CRAMPING
DESCRIPTORS: DISCOMFORT
DESCRIPTORS: BURNING;CRAMPING

## 2024-07-19 ASSESSMENT — PAIN - FUNCTIONAL ASSESSMENT: PAIN_FUNCTIONAL_ASSESSMENT: 0-10

## 2024-07-19 NOTE — PERIOP NOTE
Pt able to void 200ml PVR 81. Pt pain 2 out of 10, able to tolerate water and crackers with no nausea. Walking around room and hallway with no difficulties. Pt hit all milestones to be d/c home.

## 2024-07-19 NOTE — ANESTHESIA POSTPROCEDURE EVALUATION
Department of Anesthesiology  Postprocedure Note    Patient: Negin Pham  MRN: [id number]  YOB: 1995  Date of evaluation: 7/19/2024    Procedure Summary       Date: 07/19/24 Room / Location: [facility] 04 / [facility]    Anesthesia Start: 0700 Anesthesia Stop: 0948    Procedure: ERAS/ HYSTERECTOMY ABDOMINAL LAPAROSCOPIC ROBOTIC, CYSTOSCOPY (Abdomen) Diagnosis:       Pelvic floor dysfunction in female      Abnormal uterine bleeding (AUB)      Dysmenorrhea      (Pelvic floor dysfunction in female [M62.89])      (Abnormal uterine bleeding (AUB) [N93.9])      (Dysmenorrhea [N94.6])    Surgeons: Di Salazar MD Responsible Provider: Reginald Gupta MD    Anesthesia Type: general ASA Status: 3            Anesthesia Type: No value filed.    Odalys Phase I: Odalys Score: 9    Odalys Phase II:      Anesthesia Post Evaluation    Patient location during evaluation: PACU  Patient participation: complete - patient participated  Level of consciousness: awake and awake and alert  Airway patency: patent  Nausea & Vomiting: no nausea  Cardiovascular status: hemodynamically stable  Respiratory status: acceptable  Hydration status: euvolemic  Multimodal analgesia pain management approach  Pain management: adequate    No notable events documented.

## 2024-07-19 NOTE — OP NOTE
Operative Note      Patient: Negin Pham  YOB: 1995  MRN: [id number]    Date of Procedure: 7/19/2024    Pre-Op Diagnosis Codes:     * Pelvic floor dysfunction in female [M62.89]     * Abnormal uterine bleeding (AUB) [N93.9]     * Dysmenorrhea [N94.6]    Post-Op Diagnosis: Same       Procedure(s):  ERAS/ HYSTERECTOMY ABDOMINAL LAPAROSCOPIC ROBOTIC, CYSTOSCOPY  Robotic assisted total laparoscopic hysterectomy    Surgeon(s):  Di Salazar MD    Assistant:   * No surgical staff found *    Anesthesia: General    Estimated Blood Loss (mL): less than 100     Complications: None    Specimens:   ID Type Source Tests Collected by Time Destination   A : UTERUS AND CERVIX Tissue Uterus SURGICAL PATHOLOGY Di Salazar MD 7/19/2024 0852        Implants:  * No implants in log *      Drains:   [REMOVED] Urinary Catheter 07/19/24 2 Way;Benitez (Removed)       Findings:  Infection Present At Time Of Surgery (PATOS) (choose all levels that have infection present):  No infection present  Other Findings: NEFG, uterine sound length = 9cm. Normal liver edge and gallbladder. Normal uterus and bilateral ovaries. Bilateral fallopian tubes surgically absent.    Detailed Description of Procedure:   The patient was taken to the operating room where general anesthesia was found to be adequate. Patient was prepped and draped in normal sterile fashion. A Benitez catheter was placed into the urethra.  A weighted speculum was placed in the vagina. The anterior lip of the cervix was grasped with a single-tooth tenaculum. The uterus was sounded to 9 cm. The cervix was dilated with Dustin dilators and a Tri Arch uterine manipulator was inserted in the endometrial cavity for means of manipulation. All other instruments were then removed from the vagina.      The patient was flattened so that attention could be turned to the abdomen. A 8 mm skin incision was made infraumbilically. The Veress needle was then inserted at a 45-degree  cuff was then closed with a running Stratafix suture. Good hemostasis was seen throughout. The robot was undocked.     A cystoscopy was then performed.  There was no trauma seen in the bladder or urethra.  Both ureters jetted urine.  The cystoscope was removed and the Benitez catheter replaced.  The trocars were then removed. 4-0 Monocryl was used to close the skin incisions. Steri-strips and Mastisol were applied over the incisions. Patient tolerated the procedure well. Sponge, lap, and needle counts were correct x2. The patient was taken to recovery in stable condition.      Electronically signed by Di Salazar MD on 7/19/2024 at 9:29 AM

## 2024-07-19 NOTE — PROGRESS NOTES
Discharge Criteria:     Pt may be discharged when meets criteria:  1. Tolerate Reg diet without nausea/vomiting  2.  Urinates easily with PVR (bladder scan) of <150cc  3.  Ambulates easily  4.  Pain controlled on po meds with pain rated 4/10 or less     Please contact Dr. Di Salazar with questions. If patient needs to be admitted overnight, please have her floor nurse call me.

## 2024-07-19 NOTE — PERIOP NOTE
Patient drank Pre-Surgical drink as instructed:   2 Pre Surgical drinks before midnight    Warmer placed on patient's bed. Warm IV fluids infusing in pre-op per ERAS protocol.

## 2024-07-19 NOTE — ANESTHESIA PRE PROCEDURE
Department of Anesthesiology  Preprocedure Note       Name:  Negin Pham   Age:  29 y.o.  :  1995                                          MRN:  [id number]         Date:  2024      Surgeon: Surgeon(s):  Di Salazar MD    Procedure: Procedure(s):  ERAS/ HYSTERECTOMY ABDOMINAL LAPAROSCOPIC ROBOTIC    Medications prior to admission:   Prior to Admission medications    Medication Sig Start Date End Date Taking? Authorizing Provider   Insulin Infusion Pump ISABELLA Basal Rates 0000: 0.75, 0300: 0.80, 0900: 1.00, 1200: 1.20, 2100: 1.05  Carb Ratio 0000: 15, 1200: 12, 1700: 15, 2100: 12  Correction Factor: 0000: 66  Targets  0000: 100  Active Insulin time 3 hrs  Max Bolus 20 units  Control IQ: weight: 127; TDD: 37 24   Bettina Qiu APRN - CNP   SPRINTEC 28 0.25-35 MG-MCG per tablet Take 1 tablet by mouth daily  Patient not taking: Reported on 2024   Provider, MD Cristobal   Continuous Glucose Sensor (DEXCOM G7 SENSOR) MISC Change every 10 days to monitor blood glucose, E10.65 24   Bettina Qiu APRN - CNP   ibuprofen (ADVIL;MOTRIN) 800 MG tablet Take 1 tablet by mouth every 8 hours as needed for Pain 7/10/24   Di Salazar MD   ARIPiprazole (ABILIFY) 5 MG tablet Take 1 tablet by mouth daily 24   Tj Bullock MD   lamoTRIgine (LAMICTAL) 150 MG tablet Take 1 tablet by mouth Daily with supper 24   Tj Bullock MD   traZODone (DESYREL) 50 MG tablet Take 1 tablet by mouth nightly 24   Tj Bullock MD   NOVOLOG 100 UNIT/ML injection vial For use in insulin pump. Max daily dose: 60 units 24   Betitna Qiu APRN - CNP   Ubrogepant (UBRELVY) 100 MG TABS Take 100 mg by mouth daily as needed (May repeat for 1 dose in 2 hours if needed. Not to exceed 200 mg/24 hour.) 24   Rice, Erlinda L, APRN   Atogepant 60 MG TABS Take 60 mg by mouth daily  Patient taking differently: Take 60 mg by mouth at bedtime 24   Erlinda Parikh

## 2024-07-19 NOTE — INTERVAL H&P NOTE
Update History & Physical    The patient's History and Physical of July 12, 2024 was reviewed with the patient and I examined the patient. There was no change. The surgical site was confirmed by the patient and me.     Plan: The risks, benefits, expected outcome, and alternative to the recommended procedure have been discussed with the patient. Patient understands and wants to proceed with the procedure.     Electronically signed by Di Salazar MD on 7/19/2024 at 6:33 AM

## 2024-08-02 ENCOUNTER — OFFICE VISIT (OUTPATIENT)
Dept: OBGYN CLINIC | Age: 29
End: 2024-08-02

## 2024-08-02 VITALS
SYSTOLIC BLOOD PRESSURE: 102 MMHG | DIASTOLIC BLOOD PRESSURE: 60 MMHG | HEIGHT: 62 IN | BODY MASS INDEX: 19.49 KG/M2 | WEIGHT: 105.9 LBS

## 2024-08-02 DIAGNOSIS — Z98.890 POST-OPERATIVE STATE: Primary | ICD-10-CM

## 2024-08-02 PROCEDURE — 99024 POSTOP FOLLOW-UP VISIT: CPT | Performed by: STUDENT IN AN ORGANIZED HEALTH CARE EDUCATION/TRAINING PROGRAM

## 2024-08-02 NOTE — PROGRESS NOTES
Negin presents for postop visit from ERAS/ HYSTERECTOMY ABDOMINAL LAPAROSCOPIC ROBOTIC, CYSTOSCOPY about 2 weeks ago. Doing well postoperatively.without any bleeding.  Fever: No Voiding well: Yes.  Bowel movements OK: Yes.     Exam: A&OX3, NAD.  Abdomen:  Non tender Incisions clean, dry, intact    Discussed pathology report which was   A:  \"UTERUS AND CERVIX\": CHRONIC CERVICITIS WITH SQUAMOUS METAPLASIA. ENDOMETRIUM HAVING GLANDULAR STROMAL DISPARITY       A/P.  Stable Post op condition.  Gradually increase activity. Resumption of sexual activity is not encouraged at this time.  Follow up 6 weeks for cuff check.      Di Salazar MD  [facility]

## 2024-08-08 NOTE — PROGRESS NOTES
Negin Pham (:  1995) is a 29 y.o. female new patient referred to our office for evaluation of the following chief complaint(s):  Follow-up        Assessment & Plan   ASSESSMENT/PLAN:  1. Morton's esophagus without dysplasia  -     esomeprazole (NEXIUM) 40 MG delayed release capsule; Take 1 capsule by mouth every morning (before breakfast), Disp-180 capsule, R-0Normal  2. Mild chronic gastritis  3. Gastroesophageal reflux disease without esophagitis  -     esomeprazole (NEXIUM) 40 MG delayed release capsule; Take 1 capsule by mouth every morning (before breakfast), Disp-180 capsule, R-0Normal           Documentation:  I communicated with the patient and/or health care decision maker about diagnosis listed above.   Details of this discussion including any medical advice provided: Reviewed recent endoscopy findings. Hx gastroparesis; we agreed on little benefit to repeat GES. Needs to eat more frequent, smaller meals. Will have her increase Nexium 40 mg to BID AC dosing until follow-up in 3 month. Discussed likelihood of superimposed IBS with hx alternating bowel habits and mucus in stool. IBS and low-FODMAP diet handouts provided via [employer]. Currently dealing with more constipation postoperatively; constipation handout also provided.    Total Time: minutes: 11-20 minutes    Negin Pham was evaluated through a synchronous (real-time) audio encounter. Patient identification was verified at the start of the visit. She (or guardian if applicable) is aware that this is a billable service, which includes applicable co-pays. This visit was conducted with the patient's (and/or legal guardian's) verbal consent. She has not had a related appointment within my department in the past 7 days or scheduled within the next 24 hours.   The patient was located at Home: [address] [place] [address]  [address] [place] [id number].  The provider was located at Home (Appt Dept State): [place].    Note: not billable if this call

## 2024-08-08 NOTE — PATIENT INSTRUCTIONS
For reflux:   Eat smaller and more frequent meals. Avoid lying down for 3 hours after eating. Elevate the head of the bed by 6 inches. Avoid wearing tight-fitting clothing. Stop smoking and avoid alcohol. Avoid NSAID medications (Aspirin, Excedrin, Aleve, Ibuprofen, Goody Powder, Toradol, Mobic, Voltaren, etc). Consider weight loss to get to a healthy weight, which is often critical to eliminating symptoms of reflux. Avoid foods and acid-containing beverages that can exacerbate the symptoms of reflux. This includes:     -Caffeine  -Chocolate  -Peppermint  -Alcohol (especially red wine)  -Carbonated beverages  -Citrus fruits  -Tomato-based products  -Vinegar  -Spicy and greasy foods     For constipation:  Recommend Miralax 1 cap 1-2 x daily and stool softener (ie Colace 100 mg) twice daily. You can add milk of magnesia or magnesium citrate as needed for additional relief.  Add Dulcolax or glycerin suppository if no bowel movement after 2-3 days.    Increase daily fiber intake to 25-35 grams daily either by dietary intake or an over-the-counter psyllium husk fiber supplement. Limit alcohol and fatty food intake. Drink at least 80 oz water daily or more as needed to maintain adequate hydration. Exercise regularly and consider weight loss if appropriate based on your current weight. Avoid straining or lingering on the toilet longer than necessary. Try scheduled toilet time approximately 30 minutes after your first drink or meal of the day. Elevate your feet when toileting to bring your knees in line with your hips using a small stool or Squatty Potty. Review your medication list and discuss with your PCP whether any of these medications may exacerbate constipation.

## 2024-08-09 ENCOUNTER — NURSE ONLY (OUTPATIENT)
Age: 29
End: 2024-08-09

## 2024-08-09 DIAGNOSIS — K21.9 GASTROESOPHAGEAL REFLUX DISEASE WITHOUT ESOPHAGITIS: ICD-10-CM

## 2024-08-09 DIAGNOSIS — K22.70 BARRETT'S ESOPHAGUS WITHOUT DYSPLASIA: Primary | ICD-10-CM

## 2024-08-09 DIAGNOSIS — K29.50 MILD CHRONIC GASTRITIS: ICD-10-CM

## 2024-08-09 RX ORDER — ESOMEPRAZOLE MAGNESIUM 40 MG/1
40 CAPSULE, DELAYED RELEASE ORAL
Qty: 180 CAPSULE | Refills: 0 | Status: SHIPPED | OUTPATIENT
Start: 2024-08-09

## 2024-09-03 ENCOUNTER — PATIENT MESSAGE (OUTPATIENT)
Dept: OBGYN CLINIC | Age: 29
End: 2024-09-03

## 2024-09-03 DIAGNOSIS — B37.31 CANDIDA VAGINITIS: Primary | ICD-10-CM

## 2024-09-03 RX ORDER — FLUCONAZOLE 150 MG/1
TABLET ORAL
Qty: 2 TABLET | Refills: 0 | Status: SHIPPED | OUTPATIENT
Start: 2024-09-03

## 2024-09-12 ENCOUNTER — OFFICE VISIT (OUTPATIENT)
Dept: OBGYN CLINIC | Age: 29
End: 2024-09-12

## 2024-09-12 VITALS
SYSTOLIC BLOOD PRESSURE: 110 MMHG | DIASTOLIC BLOOD PRESSURE: 66 MMHG | WEIGHT: 105.6 LBS | BODY MASS INDEX: 19.43 KG/M2 | HEIGHT: 62 IN

## 2024-09-12 DIAGNOSIS — Z98.890 POST-OPERATIVE STATE: Primary | ICD-10-CM

## 2024-09-12 DIAGNOSIS — N94.6 DYSMENORRHEA: ICD-10-CM

## 2024-09-12 PROCEDURE — 99024 POSTOP FOLLOW-UP VISIT: CPT | Performed by: STUDENT IN AN ORGANIZED HEALTH CARE EDUCATION/TRAINING PROGRAM

## 2024-09-18 ENCOUNTER — HOSPITAL ENCOUNTER (OUTPATIENT)
Dept: PHYSICAL THERAPY | Age: 29
Setting detail: RECURRING SERIES
Discharge: HOME | End: 2024-09-21
Payer: COMMERCIAL

## 2024-09-18 DIAGNOSIS — R27.8 OTHER LACK OF COORDINATION: Primary | ICD-10-CM

## 2024-09-18 DIAGNOSIS — M62.81 MUSCLE WEAKNESS (GENERALIZED): ICD-10-CM

## 2024-09-18 DIAGNOSIS — N39.46 MIXED INCONTINENCE: ICD-10-CM

## 2024-09-18 DIAGNOSIS — M62.838 OTHER MUSCLE SPASM: ICD-10-CM

## 2024-09-18 DIAGNOSIS — R39.89 OTHER SYMPTOMS AND SIGNS INVOLVING THE GENITOURINARY SYSTEM: ICD-10-CM

## 2024-09-18 PROCEDURE — 97530 THERAPEUTIC ACTIVITIES: CPT

## 2024-09-18 PROCEDURE — 97161 PT EVAL LOW COMPLEX 20 MIN: CPT

## 2024-09-18 ASSESSMENT — PAIN SCALES - GENERAL: PAINLEVEL_OUTOF10: 3

## 2024-09-26 ENCOUNTER — HOSPITAL ENCOUNTER (OUTPATIENT)
Dept: PHYSICAL THERAPY | Age: 29
Setting detail: RECURRING SERIES
Discharge: HOME | End: 2024-09-29
Payer: COMMERCIAL

## 2024-09-26 PROCEDURE — 97110 THERAPEUTIC EXERCISES: CPT

## 2024-09-26 ASSESSMENT — PAIN SCALES - GENERAL: PAINLEVEL_OUTOF10: 0

## 2024-09-26 NOTE — PROGRESS NOTES
Negin Pham  : 1995  Primary: [facility]  Secondary:  [place] [address]  [address]  [facility] [address]  Phone: [phone]  Fax: [phone] Plan Frequency: 1xweek/6 weeks    Plan of Care/Certification Expiration Date: 24        Plan of Care/Certification Expiration Date:  Plan of Care/Certification Expiration Date: 24    Frequency/Duration:   Plan Frequency: 1xweek/6 weeks      Time In/Out:   Time In: 1404  Time Out: 1454      PT Visit Info:         Visit Count:  2    OUTPATIENT PHYSICAL THERAPY:   Treatment Note 2024       Episode  (PFPT)               Treatment Diagnosis:    Other lack of coordination  Muscle weakness (generalized)  Mixed incontinence  Other muscle spasm  Other symptoms and signs involving the genitourinary system  Medical/Referring Diagnosis:    PFD (pelvic floor dysfunction) [M62.89]    Referring Physician:  Di Salazar MD MD Orders:  PT Eval and Treat   Return MD Appt:  unknown   Date of Onset:  Onset Date: 23     Allergies:   Latex, Adhesive tape, Cyproheptadine, and Tylenol [acetaminophen]  Restrictions/Precautions:   None      Interventions Planned (Treatment may consist of any combination of the following):     See Assessment Note    Subjective Comments:   No questions from IE. Having a hard time with PF dorps/knowing how to relax muscles.   Initial Pain Level::     0/10; bladder irritation   Post Session Pain Level:       0/10  Medications Last Reviewed:  2024  Updated Objective Findings:  None Today  Treatment   THERAPEUTIC EXERCISE: (50 minutes): Exercises per grid below to improve    Date:  24 Date:   Date:     Activity/Exercise Parameters Parameters Parameters   HEP None this day      Diaphragmatic breathing  3 min     Supine PF stretch   3 min     Supine hip adductor stretch  3x 30s B     Hamstring stretch   3x 30s B    Chuck stretch  3x 30s B     Piriformis stretch  3x30s B

## 2024-10-03 ENCOUNTER — HOSPITAL ENCOUNTER (OUTPATIENT)
Dept: PHYSICAL THERAPY | Age: 29
Setting detail: RECURRING SERIES
Discharge: HOME | End: 2024-10-06
Payer: COMMERCIAL

## 2024-10-03 PROCEDURE — 97140 MANUAL THERAPY 1/> REGIONS: CPT

## 2024-10-03 PROCEDURE — 97110 THERAPEUTIC EXERCISES: CPT

## 2024-10-03 ASSESSMENT — PAIN SCALES - GENERAL: PAINLEVEL_OUTOF10: 2

## 2024-10-03 NOTE — PROGRESS NOTES
education       Manual Treatment ( 10 minutes)   Date Type Location Comments   10/3/2024 Internal assessment/treatment Via vaginal canal PF drops assessment. Some tactile cuing with downward pressure to assist.                                        (Used abbreviations: MET - muscle energy technique; SCS- Strain counter strain; CTM-Connective tissue mobilizations; CR- Contract/Relax; SP- Sustained pressure; PIT- Positional inhibition techniques; STM Soft -tissue mobilization; MM- Myofascial mobilization; TrP-Trigger point release; IASTM- Instrument assisted soft tissue mobilizations, TDN-Trigger point dry needling)    Pt gives verbal consent to internal vaginal assessment/treatment without chaperon present.      Treatment/Session Summary:    Treatment Assessment:    After tactile and verbal cuing, patient able to demonstrate appropriate lengthening of PFM (performed internally). Attempted to add ball squeeze for gentle stabilization, however, patient verbalized sharp pain near pubic symphysis. Was able to add bridges and clams with good tolerance and no onset of pain.   Communication/Consultation:  None today  Equipment provided today:   aforementioned handouts   Recommendations/Intent for next treatment session: Next visit will focus on assessment of bladder schedule, intneral manual to assess drops     >Total Treatment Billable Duration:  10 minutes MT and 46 minutes TE   Time In: 0852  Time Out: 0948    SANDRO ACOSTA PT         Charge Capture  Events  [facility]  Appt Desk  Attendance Report     Future Appointments   Date Time Provider Department Center   10/10/2024  9:00 AM Sandro Acosta PT [facility]   10/15/2024  3:00 PM Bettina Qiu APRN - CNP [facility]   10/17/2024  2:00 PM Sandro Acosta PT [facility]   10/24/2024  2:00 PM Sandro Acosta PT [facility]   11/7/2024  2:00 PM Sandro Acosta PT [facility]   11/14/2024 11:00 AM Grinnell, Melissa R, PA-C

## 2024-10-10 ENCOUNTER — HOSPITAL ENCOUNTER (OUTPATIENT)
Dept: PHYSICAL THERAPY | Age: 29
Setting detail: RECURRING SERIES
Discharge: HOME | End: 2024-10-13
Payer: COMMERCIAL

## 2024-10-10 PROCEDURE — 97110 THERAPEUTIC EXERCISES: CPT

## 2024-10-10 ASSESSMENT — PAIN SCALES - GENERAL: PAINLEVEL_OUTOF10: 3

## 2024-10-10 NOTE — PROGRESS NOTES
Negin Pham  : 1995  Primary: [facility] ([facility])  Secondary:  [place] [address]  [address] [place] [address]  Phone: [phone]  Fax: [phone] Plan Frequency: 1xweek/6 weeks    Plan of Care/Certification Expiration Date: 24        Plan of Care/Certification Expiration Date:  Plan of Care/Certification Expiration Date: 24    Frequency/Duration:   Plan Frequency: 1xweek/6 weeks      Time In/Out:   Time In: 910  Time Out: 955      PT Visit Info:         Visit Count:  4    OUTPATIENT PHYSICAL THERAPY:   Treatment Note 10/10/2024       Episode  (PFPT)               Treatment Diagnosis:    Other lack of coordination  Muscle weakness (generalized)  Mixed incontinence  Other muscle spasm  Other symptoms and signs involving the genitourinary system  Medical/Referring Diagnosis:    PFD (pelvic floor dysfunction) [M62.89]    Referring Physician:  Di Salazar MD MD Orders:  PT Eval and Treat   Return MD Appt:  unknown   Date of Onset:  Onset Date: 23     Allergies:   Latex, Adhesive tape, Cyproheptadine, and Tylenol [acetaminophen]  Restrictions/Precautions:   None      Interventions Planned (Treatment may consist of any combination of the following):     See Assessment Note    Subjective Comments:    States pain is about the same   Initial Pain Level::     3/10    Post Session Pain Level:       3/10  Medications Last Reviewed:  10/10/2024  Updated Objective Findings:  None Today  Treatment   THERAPEUTIC EXERCISE: (45 minutes): Exercises per grid below to improve    Date:  24 Date:  24 Date:  10/3/24 Date:  10/10/24   Activity/Exercise Parameters Parameters Parameters Parameters    HEP None this day       Diaphragmatic breathing  3 min  3 min  3 min    Supine PF stretch   3 min  3 min  3 min    Supine hip adductor stretch  3x 30s B  3 x30s B  3x 30s B    Hamstring stretch   3x 30s B 3 x 30s B 3 x 30s B   Chuck stretch  3x

## 2024-10-14 ASSESSMENT — ENCOUNTER SYMPTOMS
CONSTIPATION: 0
DIARRHEA: 0

## 2024-10-14 NOTE — PROGRESS NOTES
pregnancy and is aware of complications associated with pregnancy and uncontrolled diabetes. Advised A1c goal less than 6.5 prior to becoming pregnant. Contraceptive method: IUD    Diabetes education: The patient has received formal diabetes education.    Diabetic complications:     Retinopathy: Last eye exam was around ~ and demonstrated no diabetic retinopathy.  Eye care specialist is unknown. Has an appointment scheduled.     Albuminuria/nephropathy: none     Neuropathy: potential autonomic neuropathy (orthostatic hypotension), poor circulation in hands and feet, gets pins and needles     Hypoglycemia: - awareness until glucose is 40-50    Hyperglycemia: + awareness     Pertinent Co-morbid Diagnoses:   Cardiac: hyperlipidemia, family history of early cardiac death, father  from CHF at age 28  Current therapy: rosuvastatin - 5 MG with good consistency in the PM     The ASCVD Risk score (Kristian MARINO, et al., 2019) failed to calculate for the following reasons:    The 2019 ASCVD risk score is only valid for ages 40 to 79    Renal:   Under care of nephro? no   Not on Ace-I/ARB This patient does not have an active medication from one of the medication groupers.  Computed KFRE 2-Year unavailable. One or more values for this score either were not found within the given timeframe or did not fit some other criterion.      Liver:   History of hepatitis: denies; excessive alcohol consumption: denies      Pertinent Lab History:    Hemoglobin A1c:   2012: 11.1%   2013: 13.4%   2014: 9.5%   2015: 9.9%   2016: 8.8%   2017: 6.1%   07/10/2018: 11.4%   2019: 10.1%   2020: 8.6%   2020: 8.7%   2021: 6.9%   2021: 8.0%   2021: 7.0%   10/28/2021: 6.4%   2021: 6.0%   2021: 6.0%   2022: 6.2%   2022: 6.0%   2022: 5.9%   2022: 5.8%   2022: 5.6%   2022: 5.8%   2022: 7.4%   2022: 7.6%   2023:

## 2024-10-15 ENCOUNTER — OFFICE VISIT (OUTPATIENT)
Dept: ENDOCRINOLOGY | Age: 29
End: 2024-10-15
Payer: COMMERCIAL

## 2024-10-15 VITALS
BODY MASS INDEX: 20.06 KG/M2 | DIASTOLIC BLOOD PRESSURE: 55 MMHG | HEART RATE: 69 BPM | HEIGHT: 62 IN | SYSTOLIC BLOOD PRESSURE: 98 MMHG | RESPIRATION RATE: 16 BRPM | WEIGHT: 109 LBS | OXYGEN SATURATION: 100 %

## 2024-10-15 DIAGNOSIS — Z96.41 INSULIN PUMP STATUS: Chronic | ICD-10-CM

## 2024-10-15 DIAGNOSIS — E10.65 TYPE 1 DIABETES MELLITUS WITH HYPERGLYCEMIA (HCC): Primary | ICD-10-CM

## 2024-10-15 DIAGNOSIS — E03.9 PRIMARY HYPOTHYROIDISM: ICD-10-CM

## 2024-10-15 LAB — HBA1C MFR BLD: 6.7 %

## 2024-10-15 PROCEDURE — 3046F HEMOGLOBIN A1C LEVEL >9.0%: CPT | Performed by: NURSE PRACTITIONER

## 2024-10-15 PROCEDURE — G8427 DOCREV CUR MEDS BY ELIG CLIN: HCPCS | Performed by: NURSE PRACTITIONER

## 2024-10-15 PROCEDURE — G8420 CALC BMI NORM PARAMETERS: HCPCS | Performed by: NURSE PRACTITIONER

## 2024-10-15 PROCEDURE — 99214 OFFICE O/P EST MOD 30 MIN: CPT | Performed by: NURSE PRACTITIONER

## 2024-10-15 PROCEDURE — G8484 FLU IMMUNIZE NO ADMIN: HCPCS | Performed by: NURSE PRACTITIONER

## 2024-10-15 PROCEDURE — 83036 HEMOGLOBIN GLYCOSYLATED A1C: CPT | Performed by: NURSE PRACTITIONER

## 2024-10-15 PROCEDURE — 95251 CONT GLUC MNTR ANALYSIS I&R: CPT | Performed by: NURSE PRACTITIONER

## 2024-10-15 PROCEDURE — 1036F TOBACCO NON-USER: CPT | Performed by: NURSE PRACTITIONER

## 2024-10-15 PROCEDURE — 2022F DILAT RTA XM EVC RTNOPTHY: CPT | Performed by: NURSE PRACTITIONER

## 2024-10-15 NOTE — ASSESSMENT & PLAN NOTE
A1c 6.7%, GMI: 7.4%. Glycemic control sub-optimal with A1c at goal of less than 7%.  She isn't bolusing very often as she tends to have hypoglycemia after she gets a correction bolus. Will weaken ISF to 70. Her pump is out of warranty, she wants to go to [facility]. Order sent.   Due for routine lab work. Orders placed today. She plans to have her antibodies drawn at the same time and requests additional autoimmune markers. Advised I do not manage autoimmune disease outside from the endocrine system and I will refer to rheumatology if positive.   Patient is overdue for her annual diabetic eye exam. Counseled on optimizing glycemic control, blood pressure and cholesterol to reduce risk or slow progression of diabetic retinopathy. They have an upcoming appointment.

## 2024-10-15 NOTE — ASSESSMENT & PLAN NOTE
Patient was biochemically euthyroid on 25 mcg of generic levothyroxine in April 2024.  Repeat thyroid function tests before next visit. Will also check autoimmune markers at her request.

## 2024-10-17 ENCOUNTER — TELEPHONE (OUTPATIENT)
Dept: ENDOCRINOLOGY | Age: 29
End: 2024-10-17

## 2024-10-17 ENCOUNTER — HOSPITAL ENCOUNTER (OUTPATIENT)
Dept: PHYSICAL THERAPY | Age: 29
Setting detail: RECURRING SERIES
Discharge: HOME | End: 2024-10-20
Payer: COMMERCIAL

## 2024-10-17 PROCEDURE — 97110 THERAPEUTIC EXERCISES: CPT

## 2024-10-17 NOTE — PROGRESS NOTES
Hamstring stretch   3x 30s B 3 x 30s B 3 x 30s B 3 x 30s B    Chuck stretch  3x 30s B  3 x 30s B  3 x 30s B 3x 30s B   Piriformis stretch  3x30s B   3 x 30s B  3x 30s B    Lila pose + aly breathing   3 min       Bridges    30 30  30   Hip add  ball squeeze   30; unable to complete  30, able to complete this session  30, able to complete this session    clams   Supine with band, 30 reps  Supine with band 30 reps  Supine with band 30 reps    TA     30 reps              THERAPEUTIC ACTIVITY: ( 0 minutes): Functional activity education regarding anatomy, pathology and role of pelvic floor muscle (PFM) function in relation to presenting symptoms and role of pelvic floor therapy in conservative treatment. and Functional activity education on avoiding bladder irritants, substitutions for common irritants, recommended fluid intake (types and spacing), appropriate voiding frequency, weight management and overall contributing factors of bowel health on bladder health/function in order to improve independent self management. Patient was provided a list of common bladder irritants including caffeine, carbonation, alcohol, artificial sweeteners, chocolate, acidic drinks, and tomato based drinks.    TA Educational Topic Notes Date Completed   Pathology/Anatomy/PFM Function Pt educated on function of PFM in relation to symptoms 9/18/24   Bladder health education Pt edu and given handout on general bladder health with emphasis on normal urinary frequency, mechanics during urination (relaxation), prevention of \"just in case\" urinating and neurophysiology of bladder filling 9/18/24   Urinary urge suppression     The knack     Voiding strategies     Nocturia tips     Bowel/Bladder log     Bowel health education     Constipation care     Diarrhea/Fecal leakage     Colonic massage     Toilet positioning Pt edu and given handout on toilet posture to help increase anorectal angle during defecation and to facilitate relaxation of PFM

## 2024-10-18 NOTE — TELEPHONE ENCOUNTER
Alicja Gutiérrez  [facility]  10/18 ? 11:52AM  Good morning! [employer] spoke with the patient on 10/17 and discussed the renewal process. We will be reaching back out to the patient when she is officially OOW and we can get the process started on 10/27/2024. Thank you!

## 2024-10-23 DIAGNOSIS — E10.65 TYPE 1 DIABETES MELLITUS WITH HYPERGLYCEMIA (HCC): ICD-10-CM

## 2024-10-23 DIAGNOSIS — E03.9 PRIMARY HYPOTHYROIDISM: ICD-10-CM

## 2024-10-23 LAB
CHOLEST SERPL-MCNC: 145 MG/DL (ref 0–200)
HDLC SERPL-MCNC: 56 MG/DL (ref 40–60)
HDLC SERPL: 2.6 (ref 0–5)
LDLC SERPL CALC-MCNC: 72 MG/DL (ref 0–100)
T4 FREE SERPL-MCNC: 0.8 NG/DL (ref 0.9–1.7)
TRIGL SERPL-MCNC: 85 MG/DL (ref 0–150)
TSH, 3RD GENERATION: 2.86 UIU/ML (ref 0.27–4.2)
VLDLC SERPL CALC-MCNC: 17 MG/DL (ref 6–23)

## 2024-10-24 ENCOUNTER — HOSPITAL ENCOUNTER (OUTPATIENT)
Dept: PHYSICAL THERAPY | Age: 29
Setting detail: RECURRING SERIES
Discharge: HOME | End: 2024-10-27
Payer: COMMERCIAL

## 2024-10-24 LAB — ERYTHROCYTE [SEDIMENTATION RATE] IN BLOOD: 3 MM/HR (ref 0–20)

## 2024-10-24 PROCEDURE — 97110 THERAPEUTIC EXERCISES: CPT

## 2024-10-24 PROCEDURE — 97140 MANUAL THERAPY 1/> REGIONS: CPT

## 2024-10-24 ASSESSMENT — PAIN SCALES - GENERAL
PAINLEVEL_OUTOF10: 3
PAINLEVEL_OUTOF10: 3

## 2024-10-24 ASSESSMENT — PAIN DESCRIPTION - PAIN TYPE: TYPE: OTHER (COMMENT)

## 2024-10-24 NOTE — PROGRESS NOTES
RandyNegin Pham  : 1995  Primary: [facility] (Commercial)  Secondary:  [place] [address]  [address]  [facility] [address]  Phone: [phone]  Fax: [phone] Plan Frequency: 1xweek/6 weeks    Plan of Care/Certification Expiration Date: 24        Plan of Care/Certification Expiration Date:  Plan of Care/Certification Expiration Date: 24    Frequency/Duration:   Plan Frequency: 1xweek/6 weeks      Time In/Out:   Time In: 1400  Time Out: 1454      PT Visit Info:         Visit Count:  6    OUTPATIENT PHYSICAL THERAPY:   Treatment and Progress Note 10/24/2024       Episode  (PFPT)               Treatment Diagnosis:    Other lack of coordination  Muscle weakness (generalized)  Mixed incontinence  Other muscle spasm  Other symptoms and signs involving the genitourinary system  Medical/Referring Diagnosis:    PFD (pelvic floor dysfunction) [M62.89]    Referring Physician:  Di Salazar MD MD Orders:  PT Eval and Treat   Return MD Appt:  unknown   Date of Onset:  Onset Date: 23     Allergies:   Latex, Adhesive tape, Cyproheptadine, and Tylenol [acetaminophen]  Restrictions/Precautions:   None      Interventions Planned (Treatment may consist of any combination of the following):     See Assessment Note    Subjective Comments:   Waiting for blood work to come back to see if she can be referred to rheumatology   Initial Pain Level::     3/10    Post Session Pain Level:       3/10  Medications Last Reviewed:  10/24/2024  Updated Objective Findings:   see below   External Observations:  Voluntary contraction: Present  Voluntary relaxation: Present  Involuntary contraction: Absent   Involuntary relaxation: Present  Perineal descent at rest: Absent   Perineal descent with bearing: Present  Skin integrity: WNL        Pelvic Floor Muscle (PFM) Assessment:  Vaginal vault size: [] decreased     [x] increased     [] WNL  Muscle volume: [] decreased     [x]

## 2024-10-25 LAB
ANA SER QL: NEGATIVE
THYROPEROXIDASE AB SERPL-ACNC: 293 IU/ML (ref 0–34)

## 2024-10-26 LAB — CRP SERPL-MCNC: <1 MG/L (ref 0–10)

## 2024-10-29 DIAGNOSIS — R55 SYNCOPE AND COLLAPSE: ICD-10-CM

## 2024-10-29 DIAGNOSIS — E10.65 TYPE 1 DIABETES MELLITUS WITH HYPERGLYCEMIA (HCC): ICD-10-CM

## 2024-10-29 LAB
ALBUMIN SERPL-MCNC: 3.9 G/DL (ref 3.5–5)
ALBUMIN/GLOB SERPL: 1.3 (ref 1–1.9)
ALP SERPL-CCNC: 54 U/L (ref 35–104)
ALT SERPL-CCNC: 8 U/L (ref 8–45)
ANION GAP SERPL CALC-SCNC: 11 MMOL/L (ref 7–16)
AST SERPL-CCNC: 18 U/L (ref 15–37)
BASOPHILS # BLD: 0.1 K/UL (ref 0–0.2)
BASOPHILS NFR BLD: 1 % (ref 0–2)
BILIRUB SERPL-MCNC: 0.6 MG/DL (ref 0–1.2)
BUN SERPL-MCNC: 6 MG/DL (ref 6–23)
CALCIUM SERPL-MCNC: 9.7 MG/DL (ref 8.8–10.2)
CHLORIDE SERPL-SCNC: 105 MMOL/L (ref 98–107)
CO2 SERPL-SCNC: 25 MMOL/L (ref 20–29)
CREAT SERPL-MCNC: 0.8 MG/DL (ref 0.6–1.1)
CREAT UR-MCNC: 119 MG/DL (ref 28–217)
DIFFERENTIAL METHOD BLD: NORMAL
EOSINOPHIL # BLD: 0.1 K/UL (ref 0–0.8)
EOSINOPHIL NFR BLD: 1 % (ref 0.5–7.8)
ERYTHROCYTE [DISTWIDTH] IN BLOOD BY AUTOMATED COUNT: 13.7 % (ref 11.9–14.6)
GLOBULIN SER CALC-MCNC: 2.9 G/DL (ref 2.3–3.5)
GLUCOSE SERPL-MCNC: 50 MG/DL (ref 70–99)
HCT VFR BLD AUTO: 42.1 % (ref 35.8–46.3)
HGB BLD-MCNC: 13.4 G/DL (ref 11.7–15.4)
IMM GRANULOCYTES # BLD AUTO: 0 K/UL (ref 0–0.5)
IMM GRANULOCYTES NFR BLD AUTO: 0 % (ref 0–5)
LYMPHOCYTES # BLD: 2.3 K/UL (ref 0.5–4.6)
LYMPHOCYTES NFR BLD: 39 % (ref 13–44)
MCH RBC QN AUTO: 28.9 PG (ref 26.1–32.9)
MCHC RBC AUTO-ENTMCNC: 31.8 G/DL (ref 31.4–35)
MCV RBC AUTO: 90.7 FL (ref 82–102)
MICROALBUMIN UR-MCNC: <1.2 MG/DL (ref 0–20)
MICROALBUMIN/CREAT UR-RTO: NORMAL MG/G (ref 0–30)
MONOCYTES # BLD: 0.4 K/UL (ref 0.1–1.3)
MONOCYTES NFR BLD: 7 % (ref 4–12)
NEUTS SEG # BLD: 3 K/UL (ref 1.7–8.2)
NEUTS SEG NFR BLD: 52 % (ref 43–78)
NRBC # BLD: 0 K/UL (ref 0–0.2)
PLATELET # BLD AUTO: 271 K/UL (ref 150–450)
PMV BLD AUTO: 11.5 FL (ref 9.4–12.3)
POTASSIUM SERPL-SCNC: 4 MMOL/L (ref 3.5–5.1)
PROT SERPL-MCNC: 6.8 G/DL (ref 6.3–8.2)
RBC # BLD AUTO: 4.64 M/UL (ref 4.05–5.2)
SODIUM SERPL-SCNC: 140 MMOL/L (ref 136–145)
WBC # BLD AUTO: 5.8 K/UL (ref 4.3–11.1)

## 2024-11-01 LAB — GAD65 AB SER-ACNC: <5 U/ML (ref 0–5)

## 2024-11-07 ENCOUNTER — HOSPITAL ENCOUNTER (OUTPATIENT)
Dept: PHYSICAL THERAPY | Age: 29
Setting detail: RECURRING SERIES
Discharge: HOME | End: 2024-11-10
Payer: COMMERCIAL

## 2024-11-07 PROCEDURE — 97110 THERAPEUTIC EXERCISES: CPT

## 2024-11-07 ASSESSMENT — PAIN SCALES - GENERAL: PAINLEVEL_OUTOF10: 3

## 2024-11-07 NOTE — THERAPY DISCHARGE
Negin Pham  : 1995  Primary: [facility] (Commercial)  Secondary:  [place] [address]  [address]  [facility] [address]  Phone: [phone]  Fax: [phone] Plan Frequency: 1xweek/6 weeks    Plan of Care/Certification Expiration Date: 24        Plan of Care/Certification Expiration Date:  Plan of Care/Certification Expiration Date: 24    Frequency/Duration: Plan Frequency: 1xweek/6 weeks      Time In/Out:   Time In: 1407  Time Out: 1438    PT Visit Info:    Plan Frequency: 1xweek/6 weeks      Visit Count:  7                OUTPATIENT PHYSICAL THERAPY:             Discharge Summary 2024               Episode (PFPT)         Treatment Diagnosis:    Other lack of coordination  Muscle weakness (generalized)  Mixed incontinence  Other muscle spasm  Other symptoms and signs involving the genitourinary system  Contributing Diagnosis:  Frequency of micturition (R35.0), Functional urinary incontinence (R39.81), Hesitancy of micturition (R39.11), Myalgia (M79.1), Nocturia (R35.1), Pelvic and perineal pain (R10.2), Pelvic floor dysfunction in female (M62.98), and Urgency of urination (R39.15)  Medical/Referring Diagnosis:    PFD (pelvic floor dysfunction) [M62.89]      Referring Physician:  iD Salazar MD MD Orders:  PT Eval and Treat   Return MD Appt:  unknown  Date of Onset:  Onset Date: 23     Allergies:  Latex, Adhesive tape, Cyproheptadine, and Tylenol [acetaminophen]  Restrictions/Precautions:    None      Medications Last Reviewed:  2024     SUBJECTIVE   History of Injury/Illness (Reason for Referral):  Ms. Pham is a 28 yo female referred to pelvic floor physical therapy (PFPT) by Di Salazar MD 2/2 PFD (pelvic floor dysfunction) [M62.89].     Discharge Summary: now primarilly low back down. Pelvic pain is random     Pt states that she has had chronic pelvic pain since adolescence. Pt states that this is also accompanied

## 2024-11-07 NOTE — PROGRESS NOTES
RandyNeign Pham  : 1995  Primary: [facility] (Commercial)  Secondary:  [place] [address]  [address] [place] [address]  Phone: [phone]  Fax: [phone] Plan Frequency: 1xweek/6 weeks    Plan of Care/Certification Expiration Date: 24        Plan of Care/Certification Expiration Date:  Plan of Care/Certification Expiration Date: 24    Frequency/Duration:   Plan Frequency: 1xweek/6 weeks      Time In/Out:   Time In: 1407  Time Out: 1438      PT Visit Info:         Visit Count:  7    OUTPATIENT PHYSICAL THERAPY:   Treatment  Note 2024       Episode  (PFPT)               Treatment Diagnosis:    Other lack of coordination  Muscle weakness (generalized)  Mixed incontinence  Other muscle spasm  Other symptoms and signs involving the genitourinary system  Medical/Referring Diagnosis:    PFD (pelvic floor dysfunction) [M62.89]    Referring Physician:  Di Salazar MD MD Orders:  PT Eval and Treat   Return MD Appt:  unknown   Date of Onset:  Onset Date: 23     Allergies:   Latex, Adhesive tape, Cyproheptadine, and Tylenol [acetaminophen]  Restrictions/Precautions:   None      Interventions Planned (Treatment may consist of any combination of the following):     See Assessment Note    Subjective Comments:   Initial Pain Level::     3/10    Post Session Pain Level:       3/10  Medications Last Reviewed:  2024  Updated Objective Findings:  See Discharge Note from today    Treatment   THERAPEUTIC EXERCISE: (31` minutes): Exercises per grid below to improve strength    Date:  24 Date:  24 Date:  10/3/24 Date:  10/10/24 Date:  10/1724  Date:  10/24/24 Date:  24   Activity/Exercise Parameters Parameters Parameters Parameters  Parameters Parameters     HEP None this day          Diaphragmatic breathing  3 min  3 min  3 min  3 min   3 min    Supine PF stretch   3 min  3 min  3 min  3 min      Supine hip adductor stretch  3x 30s

## 2024-11-10 LAB — ISLET CELL512 AB SER-ACNC: <7.5 U/ML

## 2024-11-12 LAB
INSULIN AB SER-ACNC: 8.6 UU/ML
ZNT8 AB: 18 U/ML

## 2024-12-15 ENCOUNTER — HOSPITAL ENCOUNTER (INPATIENT)
Age: 29
LOS: 1 days | Discharge: HOME | DRG: 638 | End: 2024-12-16
Attending: STUDENT IN AN ORGANIZED HEALTH CARE EDUCATION/TRAINING PROGRAM | Admitting: FAMILY MEDICINE
Payer: COMMERCIAL

## 2024-12-15 ENCOUNTER — APPOINTMENT (OUTPATIENT)
Dept: GENERAL RADIOLOGY | Age: 29
End: 2024-12-15
Payer: COMMERCIAL

## 2024-12-15 ENCOUNTER — HOSPITAL ENCOUNTER (EMERGENCY)
Age: 29
Discharge: ANOTHER HOSPITAL | End: 2024-12-15
Attending: EMERGENCY MEDICINE
Payer: COMMERCIAL

## 2024-12-15 VITALS
DIASTOLIC BLOOD PRESSURE: 59 MMHG | RESPIRATION RATE: 16 BRPM | HEART RATE: 84 BPM | HEIGHT: 62 IN | OXYGEN SATURATION: 97 % | BODY MASS INDEX: 19.14 KG/M2 | SYSTOLIC BLOOD PRESSURE: 103 MMHG | TEMPERATURE: 97.4 F | WEIGHT: 104 LBS

## 2024-12-15 DIAGNOSIS — E10.10 DIABETIC KETOACIDOSIS WITHOUT COMA ASSOCIATED WITH TYPE 1 DIABETES MELLITUS (HCC): Primary | ICD-10-CM

## 2024-12-15 LAB
ALBUMIN SERPL-MCNC: 4.8 G/DL (ref 3.5–5)
ALBUMIN/GLOB SERPL: 1.6 (ref 1–1.9)
ALP SERPL-CCNC: 83 U/L (ref 35–104)
ALT SERPL-CCNC: 5 U/L (ref 12–65)
ANION GAP SERPL CALC-SCNC: 31 MMOL/L (ref 7–16)
APPEARANCE UR: CLEAR
AST SERPL-CCNC: 18 U/L (ref 15–37)
BASOPHILS # BLD: 0.2 K/UL (ref 0–0.2)
BASOPHILS NFR BLD: 1 % (ref 0–2)
BILIRUB SERPL-MCNC: 0.7 MG/DL (ref 0–1.2)
BILIRUB UR QL: NEGATIVE
BUN SERPL-MCNC: 23 MG/DL (ref 6–23)
CALCIUM SERPL-MCNC: 10.6 MG/DL (ref 8.8–10.2)
CHLORIDE SERPL-SCNC: 92 MMOL/L (ref 98–107)
CO2 SERPL-SCNC: 7 MMOL/L (ref 20–29)
COLOR UR: ABNORMAL
CREAT SERPL-MCNC: 0.94 MG/DL (ref 0.8–1.3)
DIFFERENTIAL METHOD BLD: ABNORMAL
EKG ATRIAL RATE: 89 BPM
EKG DIAGNOSIS: NORMAL
EKG P AXIS: 83 DEGREES
EKG P-R INTERVAL: 143 MS
EKG Q-T INTERVAL: 406 MS
EKG QRS DURATION: 82 MS
EKG QTC CALCULATION (BAZETT): 489 MS
EKG R AXIS: 69 DEGREES
EKG T AXIS: 69 DEGREES
EKG VENTRICULAR RATE: 87 BPM
EOSINOPHIL # BLD: 0 K/UL (ref 0–0.8)
EOSINOPHIL NFR BLD: 0 % (ref 0.5–7.8)
ERYTHROCYTE [DISTWIDTH] IN BLOOD BY AUTOMATED COUNT: 12.5 % (ref 11.9–14.6)
GLOBULIN SER CALC-MCNC: 3 G/DL (ref 2.3–3.5)
GLUCOSE BLD STRIP.AUTO-MCNC: 213 MG/DL (ref 65–100)
GLUCOSE BLD STRIP.AUTO-MCNC: 304 MG/DL (ref 65–100)
GLUCOSE BLD STRIP.AUTO-MCNC: 308 MG/DL (ref 65–100)
GLUCOSE BLD STRIP.AUTO-MCNC: 332 MG/DL (ref 65–100)
GLUCOSE BLD STRIP.AUTO-MCNC: 358 MG/DL (ref 65–100)
GLUCOSE BLD STRIP.AUTO-MCNC: 471 MG/DL (ref 65–100)
GLUCOSE BLD STRIP.AUTO-MCNC: 473 MG/DL (ref 65–100)
GLUCOSE SERPL-MCNC: 555 MG/DL (ref 65–100)
GLUCOSE UR STRIP.AUTO-MCNC: 500 MG/DL
HCG UR QL: NEGATIVE
HCT VFR BLD AUTO: 41.5 % (ref 35.8–46.3)
HGB BLD-MCNC: 13.8 G/DL (ref 11.7–15.4)
HGB UR QL STRIP: NEGATIVE
IMM GRANULOCYTES # BLD AUTO: 0.1 K/UL (ref 0–0.5)
IMM GRANULOCYTES NFR BLD AUTO: 1 % (ref 0–5)
KETONES UR QL STRIP.AUTO: >160 MG/DL
LEUKOCYTE ESTERASE UR QL STRIP.AUTO: NEGATIVE
LIPASE SERPL-CCNC: 16 U/L (ref 13–60)
LYMPHOCYTES # BLD: 3.3 K/UL (ref 0.5–4.6)
LYMPHOCYTES NFR BLD: 27 % (ref 13–44)
MAGNESIUM SERPL-MCNC: 1.9 MG/DL (ref 1.8–2.4)
MCH RBC QN AUTO: 28.9 PG (ref 26.1–32.9)
MCHC RBC AUTO-ENTMCNC: 33.3 G/DL (ref 31.4–35)
MCV RBC AUTO: 87 FL (ref 82–102)
MONOCYTES # BLD: 0.7 K/UL (ref 0.1–1.3)
MONOCYTES NFR BLD: 6 % (ref 4–12)
NEUTS SEG # BLD: 8.2 K/UL (ref 1.7–8.2)
NEUTS SEG NFR BLD: 66 % (ref 43–78)
NITRITE UR QL STRIP.AUTO: NEGATIVE
NRBC # BLD: 0 K/UL (ref 0–0.2)
PH UR STRIP: 5.5 (ref 5–9)
PHOSPHATE SERPL-MCNC: 4 MG/DL (ref 2.5–4.5)
PLATELET # BLD AUTO: 361 K/UL (ref 150–450)
PMV BLD AUTO: 12 FL (ref 9.4–12.3)
POTASSIUM SERPL-SCNC: 4.5 MMOL/L (ref 3.5–5.1)
PROT SERPL-MCNC: 7.8 G/DL (ref 6.3–8.2)
PROT UR STRIP-MCNC: NEGATIVE MG/DL
RBC # BLD AUTO: 4.77 M/UL (ref 4.05–5.2)
SERVICE CMNT-IMP: ABNORMAL
SODIUM SERPL-SCNC: 130 MMOL/L (ref 133–143)
SP GR UR REFRACTOMETRY: 1.01 (ref 1–1.02)
UROBILINOGEN UR QL STRIP.AUTO: 0.2 EU/DL (ref 0.2–1)
WBC # BLD AUTO: 12.5 K/UL (ref 4.3–11.1)

## 2024-12-15 PROCEDURE — 80053 COMPREHEN METABOLIC PANEL: CPT

## 2024-12-15 PROCEDURE — 82962 GLUCOSE BLOOD TEST: CPT

## 2024-12-15 PROCEDURE — 84100 ASSAY OF PHOSPHORUS: CPT

## 2024-12-15 PROCEDURE — 81003 URINALYSIS AUTO W/O SCOPE: CPT

## 2024-12-15 PROCEDURE — 6370000000 HC RX 637 (ALT 250 FOR IP)

## 2024-12-15 PROCEDURE — 83690 ASSAY OF LIPASE: CPT

## 2024-12-15 PROCEDURE — 6370000000 HC RX 637 (ALT 250 FOR IP): Performed by: FAMILY MEDICINE

## 2024-12-15 PROCEDURE — 2580000003 HC RX 258: Performed by: FAMILY MEDICINE

## 2024-12-15 PROCEDURE — 6360000002 HC RX W HCPCS

## 2024-12-15 PROCEDURE — 96366 THER/PROPH/DIAG IV INF ADDON: CPT

## 2024-12-15 PROCEDURE — 81025 URINE PREGNANCY TEST: CPT

## 2024-12-15 PROCEDURE — 96361 HYDRATE IV INFUSION ADD-ON: CPT

## 2024-12-15 PROCEDURE — 85025 COMPLETE CBC W/AUTO DIFF WBC: CPT

## 2024-12-15 PROCEDURE — 93010 ELECTROCARDIOGRAM REPORT: CPT | Performed by: INTERNAL MEDICINE

## 2024-12-15 PROCEDURE — 93005 ELECTROCARDIOGRAM TRACING: CPT

## 2024-12-15 PROCEDURE — 96360 HYDRATION IV INFUSION INIT: CPT

## 2024-12-15 PROCEDURE — 83735 ASSAY OF MAGNESIUM: CPT

## 2024-12-15 PROCEDURE — 96375 TX/PRO/DX INJ NEW DRUG ADDON: CPT

## 2024-12-15 PROCEDURE — 2580000003 HC RX 258

## 2024-12-15 PROCEDURE — 71046 X-RAY EXAM CHEST 2 VIEWS: CPT

## 2024-12-15 PROCEDURE — 2000000000 HC ICU R&B

## 2024-12-15 PROCEDURE — 96365 THER/PROPH/DIAG IV INF INIT: CPT

## 2024-12-15 PROCEDURE — 99285 EMERGENCY DEPT VISIT HI MDM: CPT

## 2024-12-15 RX ORDER — SODIUM CHLORIDE 9 MG/ML
INJECTION, SOLUTION INTRAVENOUS CONTINUOUS
Status: DISCONTINUED | OUTPATIENT
Start: 2024-12-15 | End: 2024-12-15

## 2024-12-15 RX ORDER — LEVOTHYROXINE SODIUM 50 UG/1
25 TABLET ORAL
Status: DISCONTINUED | OUTPATIENT
Start: 2024-12-16 | End: 2024-12-16 | Stop reason: HOSPADM

## 2024-12-15 RX ORDER — TRAZODONE HYDROCHLORIDE 50 MG/1
50 TABLET, FILM COATED ORAL NIGHTLY
Status: DISCONTINUED | OUTPATIENT
Start: 2024-12-15 | End: 2024-12-16 | Stop reason: HOSPADM

## 2024-12-15 RX ORDER — PANTOPRAZOLE SODIUM 40 MG/1
40 TABLET, DELAYED RELEASE ORAL
Status: DISCONTINUED | OUTPATIENT
Start: 2024-12-16 | End: 2024-12-16 | Stop reason: HOSPADM

## 2024-12-15 RX ORDER — MAGNESIUM SULFATE IN WATER 40 MG/ML
2000 INJECTION, SOLUTION INTRAVENOUS PRN
Status: DISCONTINUED | OUTPATIENT
Start: 2024-12-15 | End: 2024-12-15 | Stop reason: HOSPADM

## 2024-12-15 RX ORDER — SODIUM CHLORIDE, SODIUM LACTATE, POTASSIUM CHLORIDE, AND CALCIUM CHLORIDE .6; .31; .03; .02 G/100ML; G/100ML; G/100ML; G/100ML
1000 INJECTION, SOLUTION INTRAVENOUS ONCE
Status: COMPLETED | OUTPATIENT
Start: 2024-12-15 | End: 2024-12-15

## 2024-12-15 RX ORDER — 0.9 % SODIUM CHLORIDE 0.9 %
250 INTRAVENOUS SOLUTION INTRAVENOUS
Status: COMPLETED | OUTPATIENT
Start: 2024-12-15 | End: 2024-12-15

## 2024-12-15 RX ORDER — POTASSIUM CHLORIDE 7.45 MG/ML
10 INJECTION INTRAVENOUS PRN
Status: DISCONTINUED | OUTPATIENT
Start: 2024-12-15 | End: 2024-12-15 | Stop reason: HOSPADM

## 2024-12-15 RX ORDER — 0.9 % SODIUM CHLORIDE 0.9 %
15 INTRAVENOUS SOLUTION INTRAVENOUS ONCE
Status: DISCONTINUED | OUTPATIENT
Start: 2024-12-15 | End: 2024-12-15

## 2024-12-15 RX ORDER — 0.9 % SODIUM CHLORIDE 0.9 %
15 INTRAVENOUS SOLUTION INTRAVENOUS ONCE
Status: COMPLETED | OUTPATIENT
Start: 2024-12-15 | End: 2024-12-15

## 2024-12-15 RX ORDER — ENOXAPARIN SODIUM 100 MG/ML
30 INJECTION SUBCUTANEOUS DAILY
Status: DISCONTINUED | OUTPATIENT
Start: 2024-12-15 | End: 2024-12-16 | Stop reason: HOSPADM

## 2024-12-15 RX ORDER — POLYETHYLENE GLYCOL 3350 17 G/17G
17 POWDER, FOR SOLUTION ORAL DAILY PRN
Status: DISCONTINUED | OUTPATIENT
Start: 2024-12-15 | End: 2024-12-16 | Stop reason: HOSPADM

## 2024-12-15 RX ORDER — DEXTROSE MONOHYDRATE AND SODIUM CHLORIDE 5; .45 G/100ML; G/100ML
INJECTION, SOLUTION INTRAVENOUS CONTINUOUS PRN
Status: DISCONTINUED | OUTPATIENT
Start: 2024-12-15 | End: 2024-12-15 | Stop reason: HOSPADM

## 2024-12-15 RX ORDER — LAMOTRIGINE 100 MG/1
150 TABLET ORAL
Status: DISCONTINUED | OUTPATIENT
Start: 2024-12-15 | End: 2024-12-16 | Stop reason: HOSPADM

## 2024-12-15 RX ORDER — ACETAMINOPHEN 325 MG/1
650 TABLET ORAL EVERY 6 HOURS PRN
Status: DISCONTINUED | OUTPATIENT
Start: 2024-12-15 | End: 2024-12-16 | Stop reason: HOSPADM

## 2024-12-15 RX ORDER — ROSUVASTATIN CALCIUM 5 MG/1
5 TABLET, COATED ORAL NIGHTLY
Status: DISCONTINUED | OUTPATIENT
Start: 2024-12-15 | End: 2024-12-16 | Stop reason: HOSPADM

## 2024-12-15 RX ORDER — ONDANSETRON 2 MG/ML
4 INJECTION INTRAMUSCULAR; INTRAVENOUS ONCE
Status: COMPLETED | OUTPATIENT
Start: 2024-12-15 | End: 2024-12-15

## 2024-12-15 RX ORDER — DEXTROSE MONOHYDRATE AND SODIUM CHLORIDE 5; .45 G/100ML; G/100ML
INJECTION, SOLUTION INTRAVENOUS CONTINUOUS PRN
Status: DISCONTINUED | OUTPATIENT
Start: 2024-12-15 | End: 2024-12-16

## 2024-12-15 RX ORDER — ARIPIPRAZOLE 5 MG/1
5 TABLET ORAL DAILY
Status: DISCONTINUED | OUTPATIENT
Start: 2024-12-16 | End: 2024-12-16 | Stop reason: HOSPADM

## 2024-12-15 RX ORDER — SODIUM CHLORIDE 9 MG/ML
INJECTION, SOLUTION INTRAVENOUS CONTINUOUS
Status: DISCONTINUED | OUTPATIENT
Start: 2024-12-15 | End: 2024-12-16

## 2024-12-15 RX ORDER — POTASSIUM CHLORIDE 7.45 MG/ML
10 INJECTION INTRAVENOUS PRN
Status: DISCONTINUED | OUTPATIENT
Start: 2024-12-15 | End: 2024-12-16 | Stop reason: HOSPADM

## 2024-12-15 RX ORDER — MAGNESIUM SULFATE 1 G/100ML
1000 INJECTION INTRAVENOUS PRN
Status: DISCONTINUED | OUTPATIENT
Start: 2024-12-15 | End: 2024-12-16 | Stop reason: HOSPADM

## 2024-12-15 RX ADMIN — DEXTROSE AND SODIUM CHLORIDE: 5; 450 INJECTION, SOLUTION INTRAVENOUS at 18:54

## 2024-12-15 RX ADMIN — SODIUM CHLORIDE, POTASSIUM CHLORIDE, SODIUM LACTATE AND CALCIUM CHLORIDE 1000 ML: 600; 310; 30; 20 INJECTION, SOLUTION INTRAVENOUS at 15:13

## 2024-12-15 RX ADMIN — TRAZODONE HYDROCHLORIDE 50 MG: 50 TABLET ORAL at 20:58

## 2024-12-15 RX ADMIN — ONDANSETRON 4 MG: 2 INJECTION, SOLUTION INTRAMUSCULAR; INTRAVENOUS at 15:13

## 2024-12-15 RX ADMIN — SODIUM CHLORIDE 250 ML: 9 INJECTION, SOLUTION INTRAVENOUS at 17:39

## 2024-12-15 RX ADMIN — SODIUM CHLORIDE 708 ML: 9 INJECTION, SOLUTION INTRAVENOUS at 21:05

## 2024-12-15 RX ADMIN — INSULIN HUMAN 8.2 UNITS/HR: 1 INJECTION, SOLUTION INTRAVENOUS at 16:10

## 2024-12-15 RX ADMIN — ROSUVASTATIN CALCIUM 5 MG: 5 TABLET, FILM COATED ORAL at 20:57

## 2024-12-15 RX ADMIN — ACETAMINOPHEN 650 MG: 325 TABLET, FILM COATED ORAL at 20:58

## 2024-12-15 ASSESSMENT — PAIN SCALES - GENERAL
PAINLEVEL_OUTOF10: 0
PAINLEVEL_OUTOF10: 4

## 2024-12-15 ASSESSMENT — PAIN DESCRIPTION - LOCATION
LOCATION: HEAD
LOCATION: HEAD
LOCATION: ABDOMEN

## 2024-12-15 ASSESSMENT — PAIN - FUNCTIONAL ASSESSMENT: PAIN_FUNCTIONAL_ASSESSMENT: 0-10

## 2024-12-15 ASSESSMENT — PAIN DESCRIPTION - DESCRIPTORS
DESCRIPTORS: ACHING
DESCRIPTORS: ACHING
DESCRIPTORS: ACHING;DISCOMFORT

## 2024-12-15 NOTE — ED TRIAGE NOTES
Pt to the ED from home with concerns that she is in DKA. Pt states that her blood sugar have been reading high at home and she started vomiting.       FSBS 473 in triage.

## 2024-12-15 NOTE — ED NOTES
This RN attempted to get another INT, pt refused any other IV attempts at this time, primary RN and MD aware.

## 2024-12-15 NOTE — ED PROVIDER NOTES
Emergency Department Provider Note       PCP: Di Jones, APRN - E[place]   Age: 29 y.o.   Sex: female     DISPOSITION Decision To Transfer 12/15/2024 04:21:44 PM    ICD-10-CM    1. Diabetic ketoacidosis without coma associated with type 1 diabetes mellitus (HCC)  E10.10           Medical Decision Making     Patient presents with hyperglycemia and symptoms concerning for DKA. Differential diagnosis includes other metabolic causes of hyperglycemia such as HHS, worsened diabetes or medication noncompliance. Considered possible causes of DKA to include infection (intrabdominal infection, UTI, pneumonia), infarction / ischemia (acute coronary syndrome, cerebral vascular accident, pulmonary embolism), medication non-compliance with insulin therapy, idiopathic causes.  EKG without ischemic changes chest x-ray clear.  Urine without signs of infection.  Mild leukocytosis on CBC.  Unknown cause at this time. Patient given fluids and started on insulin drip.  Hospitalist consulted for admission.  Patient accepted at [facility] will be transferred for admission there.    ED Course as of 12/15/24 1823   Sun Dec 15, 2024   1555 Called by lab with critical, gluc 555, CO2 7, anion gap 31 [EM]      ED Course User Index  [EM] Heavenly Da Silva PA-C     1 or more acute illnesses that pose a threat to life or bodily function.   Shared medical decision making was utilized in creating the patients health plan today.  I independently ordered and reviewed each unique test.    I reviewed external records: ED visit note from an outside group.  I reviewed external records: provider visit note from PCP.         ED provider's independent EKG interpretation normal rate, normal rhythm, no ST elevation            History     Patient is a 29-year-old female with past medical history significant for type 1 diabetes on insulin pump, many previous episodes of DKA who presents today to the emergency room with chief complaint of hyperglycemia.

## 2024-12-15 NOTE — H&P
Hospitalist History and Physical   Admit Date:  12/15/2024  2:32 PM   Name:  Negin Pham   Age:  29 y.o.  Sex:  female  :  1995   MRN:  [id number]   Room:  ED/    Presenting/Chief Complaint: Hyperglycemia     Reason(s) for Admission: No admission diagnoses are documented for this encounter.     History of Present Illness:   Negin Pham is a 29 y.o. female with medical history of type 1 diabetes who presented with nausea and vomiting.      Assessment & Plan:   DKA (diabetic ketoacidosis)  Elevated blood glucose of 555, Anion gap of 37, bicarb of < 7.  Most likely contributing factor to his generalized weakness, N/V.  Urine analysis positive for ketones  S/p 2 L of IV fluids in ED  Started on insulin gtt  Glucose stabilizer per DKA protocol  Obtain BG q2h, BMP q4h, vBG. Check hA1C  Once Ag<12 x2, start diabetic diet and SC basal insulin and SSI (0.5-0.8U/kg in naive)  D/c BG stabilizer 2h after initiation of SC insulin  Diabetes management to assist  NPO except ice chips  Antiemetics prn  Admit to ICU for close monitoring of blood glucose    Leukocytosis of 12.5 K in the setting of dehydration  Follow-up with procalcitonin  Urine analysis negative  Chest x-ray normal  EKG normal sinus rhythm  Follow-up with CBC in a.m.  Holding off on antibiotics    Pseudohyponatremia setting of hyperglycemia  Sodium of 130  Follow-up with BMP in a.m.     Iron deficiency anemia  Iron supplementation      Primary hypothyroidism  Synthyroid      Dyslipidemia  Statin      Chronic pain disorder  Continue home meds      PTSD (post-traumatic stress disorder)  Continue home meds      Chronic nausea  Zofran        PT/OT evals ordered?  Therapy evals ordered  Diet: Diet NPO  VTE prophylaxis: Lovenox  Code status: Prior      Non-peripheral Lines and Tubes (if present):             Hospital Problems:  Active Problems:    Iron deficiency anemia    Primary hypothyroidism    Dyslipidemia    Chronic pain disorder

## 2024-12-16 VITALS
OXYGEN SATURATION: 100 % | HEIGHT: 62 IN | DIASTOLIC BLOOD PRESSURE: 62 MMHG | SYSTOLIC BLOOD PRESSURE: 96 MMHG | TEMPERATURE: 98.4 F | HEART RATE: 59 BPM | RESPIRATION RATE: 15 BRPM | WEIGHT: 110.9 LBS | BODY MASS INDEX: 20.41 KG/M2

## 2024-12-16 LAB
ANION GAP SERPL CALC-SCNC: 11 MMOL/L (ref 7–16)
ANION GAP SERPL CALC-SCNC: 12 MMOL/L (ref 7–16)
ANION GAP SERPL CALC-SCNC: 9 MMOL/L (ref 7–16)
BASOPHILS # BLD: 0 K/UL (ref 0–0.2)
BASOPHILS NFR BLD: 0 % (ref 0–2)
BUN SERPL-MCNC: 11 MG/DL (ref 6–23)
BUN SERPL-MCNC: 13 MG/DL (ref 6–23)
BUN SERPL-MCNC: 16 MG/DL (ref 6–23)
CALCIUM SERPL-MCNC: 8.5 MG/DL (ref 8.8–10.2)
CALCIUM SERPL-MCNC: 9.1 MG/DL (ref 8.8–10.2)
CALCIUM SERPL-MCNC: 9.1 MG/DL (ref 8.8–10.2)
CHLORIDE SERPL-SCNC: 105 MMOL/L (ref 98–107)
CHLORIDE SERPL-SCNC: 106 MMOL/L (ref 98–107)
CHLORIDE SERPL-SCNC: 108 MMOL/L (ref 98–107)
CO2 SERPL-SCNC: 17 MMOL/L (ref 20–29)
CO2 SERPL-SCNC: 18 MMOL/L (ref 20–29)
CO2 SERPL-SCNC: 20 MMOL/L (ref 20–29)
CREAT SERPL-MCNC: 0.72 MG/DL (ref 0.6–1.1)
CREAT SERPL-MCNC: 0.73 MG/DL (ref 0.6–1.1)
CREAT SERPL-MCNC: 0.92 MG/DL (ref 0.6–1.1)
DIFFERENTIAL METHOD BLD: ABNORMAL
EOSINOPHIL # BLD: 0 K/UL (ref 0–0.8)
EOSINOPHIL NFR BLD: 0 % (ref 0.5–7.8)
ERYTHROCYTE [DISTWIDTH] IN BLOOD BY AUTOMATED COUNT: 13 % (ref 11.9–14.6)
EST. AVERAGE GLUCOSE BLD GHB EST-MCNC: 141 MG/DL
GLUCOSE BLD STRIP.AUTO-MCNC: 108 MG/DL (ref 65–100)
GLUCOSE BLD STRIP.AUTO-MCNC: 117 MG/DL (ref 65–100)
GLUCOSE BLD STRIP.AUTO-MCNC: 122 MG/DL (ref 65–100)
GLUCOSE BLD STRIP.AUTO-MCNC: 133 MG/DL (ref 65–100)
GLUCOSE BLD STRIP.AUTO-MCNC: 134 MG/DL (ref 65–100)
GLUCOSE BLD STRIP.AUTO-MCNC: 144 MG/DL (ref 65–100)
GLUCOSE BLD STRIP.AUTO-MCNC: 185 MG/DL (ref 65–100)
GLUCOSE BLD STRIP.AUTO-MCNC: 189 MG/DL (ref 65–100)
GLUCOSE BLD STRIP.AUTO-MCNC: 191 MG/DL (ref 65–100)
GLUCOSE BLD STRIP.AUTO-MCNC: 228 MG/DL (ref 65–100)
GLUCOSE BLD STRIP.AUTO-MCNC: 231 MG/DL (ref 65–100)
GLUCOSE BLD STRIP.AUTO-MCNC: 269 MG/DL (ref 65–100)
GLUCOSE BLD STRIP.AUTO-MCNC: 285 MG/DL (ref 65–100)
GLUCOSE SERPL-MCNC: 119 MG/DL (ref 70–99)
GLUCOSE SERPL-MCNC: 199 MG/DL (ref 70–99)
GLUCOSE SERPL-MCNC: 260 MG/DL (ref 70–99)
HBA1C MFR BLD: 6.5 % (ref 0–5.6)
HCT VFR BLD AUTO: 34.6 % (ref 35.8–46.3)
HGB BLD-MCNC: 11.7 G/DL (ref 11.7–15.4)
IMM GRANULOCYTES # BLD AUTO: 0.1 K/UL (ref 0–0.5)
IMM GRANULOCYTES NFR BLD AUTO: 0 % (ref 0–5)
LYMPHOCYTES # BLD: 1.4 K/UL (ref 0.5–4.6)
LYMPHOCYTES NFR BLD: 9 % (ref 13–44)
MAGNESIUM SERPL-MCNC: 1.7 MG/DL (ref 1.8–2.4)
MAGNESIUM SERPL-MCNC: 1.9 MG/DL (ref 1.8–2.4)
MAGNESIUM SERPL-MCNC: 2 MG/DL (ref 1.8–2.4)
MCH RBC QN AUTO: 28.8 PG (ref 26.1–32.9)
MCHC RBC AUTO-ENTMCNC: 33.8 G/DL (ref 31.4–35)
MCV RBC AUTO: 85.2 FL (ref 82–102)
MONOCYTES # BLD: 0.5 K/UL (ref 0.1–1.3)
MONOCYTES NFR BLD: 3 % (ref 4–12)
NEUTS SEG # BLD: 13.4 K/UL (ref 1.7–8.2)
NEUTS SEG NFR BLD: 87 % (ref 43–78)
NRBC # BLD: 0 K/UL (ref 0–0.2)
PHOSPHATE SERPL-MCNC: 2.7 MG/DL (ref 2.5–4.5)
PHOSPHATE SERPL-MCNC: 2.9 MG/DL (ref 2.5–4.5)
PHOSPHATE SERPL-MCNC: 2.9 MG/DL (ref 2.5–4.5)
PLATELET # BLD AUTO: 253 K/UL (ref 150–450)
PMV BLD AUTO: 11.4 FL (ref 9.4–12.3)
POTASSIUM SERPL-SCNC: 4.1 MMOL/L (ref 3.5–5.1)
POTASSIUM SERPL-SCNC: 4.4 MMOL/L (ref 3.5–5.1)
POTASSIUM SERPL-SCNC: 4.4 MMOL/L (ref 3.5–5.1)
RBC # BLD AUTO: 4.06 M/UL (ref 4.05–5.2)
SERVICE CMNT-IMP: ABNORMAL
SODIUM SERPL-SCNC: 134 MMOL/L (ref 136–145)
SODIUM SERPL-SCNC: 135 MMOL/L (ref 136–145)
SODIUM SERPL-SCNC: 137 MMOL/L (ref 136–145)
WBC # BLD AUTO: 15.3 K/UL (ref 4.3–11.1)

## 2024-12-16 PROCEDURE — 83036 HEMOGLOBIN GLYCOSYLATED A1C: CPT

## 2024-12-16 PROCEDURE — 2580000003 HC RX 258: Performed by: FAMILY MEDICINE

## 2024-12-16 PROCEDURE — 85025 COMPLETE CBC W/AUTO DIFF WBC: CPT

## 2024-12-16 PROCEDURE — 6370000000 HC RX 637 (ALT 250 FOR IP): Performed by: STUDENT IN AN ORGANIZED HEALTH CARE EDUCATION/TRAINING PROGRAM

## 2024-12-16 PROCEDURE — 83735 ASSAY OF MAGNESIUM: CPT

## 2024-12-16 PROCEDURE — 82962 GLUCOSE BLOOD TEST: CPT

## 2024-12-16 PROCEDURE — 2580000003 HC RX 258: Performed by: STUDENT IN AN ORGANIZED HEALTH CARE EDUCATION/TRAINING PROGRAM

## 2024-12-16 PROCEDURE — 36415 COLL VENOUS BLD VENIPUNCTURE: CPT

## 2024-12-16 PROCEDURE — 80048 BASIC METABOLIC PNL TOTAL CA: CPT

## 2024-12-16 PROCEDURE — 6370000000 HC RX 637 (ALT 250 FOR IP): Performed by: FAMILY MEDICINE

## 2024-12-16 PROCEDURE — 84100 ASSAY OF PHOSPHORUS: CPT

## 2024-12-16 RX ORDER — SODIUM CHLORIDE, SODIUM LACTATE, POTASSIUM CHLORIDE, AND CALCIUM CHLORIDE .6; .31; .03; .02 G/100ML; G/100ML; G/100ML; G/100ML
1000 INJECTION, SOLUTION INTRAVENOUS ONCE
Status: COMPLETED | OUTPATIENT
Start: 2024-12-16 | End: 2024-12-16

## 2024-12-16 RX ORDER — SODIUM BICARBONATE 650 MG/1
650 TABLET ORAL 4 TIMES DAILY
Qty: 20 TABLET | Refills: 0 | Status: SHIPPED | OUTPATIENT
Start: 2024-12-16 | End: 2024-12-21

## 2024-12-16 RX ORDER — LANOLIN ALCOHOL/MO/W.PET/CERES
400 CREAM (GRAM) TOPICAL ONCE
Status: COMPLETED | OUTPATIENT
Start: 2024-12-16 | End: 2024-12-16

## 2024-12-16 RX ORDER — INSULIN LISPRO 100 [IU]/ML
0-4 INJECTION, SOLUTION INTRAVENOUS; SUBCUTANEOUS
Status: DISCONTINUED | OUTPATIENT
Start: 2024-12-16 | End: 2024-12-16 | Stop reason: HOSPADM

## 2024-12-16 RX ORDER — SODIUM BICARBONATE 650 MG/1
650 TABLET ORAL 4 TIMES DAILY
Status: DISCONTINUED | OUTPATIENT
Start: 2024-12-16 | End: 2024-12-16

## 2024-12-16 RX ORDER — LANOLIN ALCOHOL/MO/W.PET/CERES
400 CREAM (GRAM) TOPICAL DAILY
Status: DISCONTINUED | OUTPATIENT
Start: 2024-12-16 | End: 2024-12-16

## 2024-12-16 RX ORDER — INSULIN LISPRO 100 [IU]/ML
4 INJECTION, SOLUTION INTRAVENOUS; SUBCUTANEOUS
Status: DISCONTINUED | OUTPATIENT
Start: 2024-12-16 | End: 2024-12-16 | Stop reason: HOSPADM

## 2024-12-16 RX ORDER — INSULIN GLARGINE 100 [IU]/ML
12 INJECTION, SOLUTION SUBCUTANEOUS DAILY
Status: DISCONTINUED | OUTPATIENT
Start: 2024-12-16 | End: 2024-12-16 | Stop reason: HOSPADM

## 2024-12-16 RX ORDER — SODIUM BICARBONATE 650 MG/1
650 TABLET ORAL 4 TIMES DAILY
Status: DISCONTINUED | OUTPATIENT
Start: 2024-12-16 | End: 2024-12-16 | Stop reason: HOSPADM

## 2024-12-16 RX ORDER — NICOTINE 21 MG/24HR
1 PATCH, TRANSDERMAL 24 HOURS TRANSDERMAL DAILY
Status: DISCONTINUED | OUTPATIENT
Start: 2024-12-16 | End: 2024-12-16 | Stop reason: HOSPADM

## 2024-12-16 RX ADMIN — PANTOPRAZOLE SODIUM 40 MG: 40 TABLET, DELAYED RELEASE ORAL at 08:52

## 2024-12-16 RX ADMIN — SODIUM CHLORIDE, POTASSIUM CHLORIDE, SODIUM LACTATE AND CALCIUM CHLORIDE 1000 ML: 600; 310; 30; 20 INJECTION, SOLUTION INTRAVENOUS at 08:59

## 2024-12-16 RX ADMIN — INSULIN GLARGINE 12 UNITS: 100 INJECTION, SOLUTION SUBCUTANEOUS at 10:24

## 2024-12-16 RX ADMIN — LEVOTHYROXINE SODIUM 25 MCG: 0.05 TABLET ORAL at 08:52

## 2024-12-16 RX ADMIN — SODIUM BICARBONATE 650 MG TABLET 650 MG: at 09:33

## 2024-12-16 RX ADMIN — Medication 400 MG: at 09:33

## 2024-12-16 RX ADMIN — ARIPIPRAZOLE 5 MG: 5 TABLET ORAL at 08:52

## 2024-12-16 RX ADMIN — DEXTROSE AND SODIUM CHLORIDE: 5; 450 INJECTION, SOLUTION INTRAVENOUS at 01:49

## 2024-12-16 RX ADMIN — INSULIN LISPRO 4 UNITS: 100 INJECTION, SOLUTION INTRAVENOUS; SUBCUTANEOUS at 11:58

## 2024-12-16 RX ADMIN — INSULIN LISPRO 2 UNITS: 100 INJECTION, SOLUTION INTRAVENOUS; SUBCUTANEOUS at 10:25

## 2024-12-16 ASSESSMENT — PAIN SCALES - GENERAL
PAINLEVEL_OUTOF10: 0
PAINLEVEL_OUTOF10: 0

## 2024-12-16 NOTE — CARE COORDINATION
Pt chart reviewed and discussed in Critical Care Interdisciplinary Rounds. LOS 1 day. Pt admitted with DKA, type, not at goal. Per MD, GAP closed; pt transitioning to home insulin pump. Anticipate discharge in next 24 hours.     CM team will continue to follow for potential discharge needs that may arise.       Sara Oreilly, MSW, LBSW    [facility]

## 2024-12-16 NOTE — PLAN OF CARE
Problem: Chronic Conditions and Co-morbidities  Goal: Patient's chronic conditions and co-morbidity symptoms are monitored and maintained or improved  Outcome: Progressing  Flowsheets (Taken 12/16/2024 0745)  Care Plan - Patient's Chronic Conditions and Co-Morbidity Symptoms are Monitored and Maintained or Improved:   Monitor and assess patient's chronic conditions and comorbid symptoms for stability, deterioration, or improvement   Collaborate with multidisciplinary team to address chronic and comorbid conditions and prevent exacerbation or deterioration     Problem: Discharge Planning  Goal: Discharge to home or other facility with appropriate resources  Outcome: Progressing  Flowsheets (Taken 12/16/2024 0745)  Discharge to home or other facility with appropriate resources:   Identify barriers to discharge with patient and caregiver   Arrange for needed discharge resources and transportation as appropriate   Identify discharge learning needs (meds, wound care, etc)   Refer to discharge planning if patient needs post-hospital services based on physician order or complex needs related to functional status, cognitive ability or social support system     Problem: Safety - Adult  Goal: Free from fall injury  Outcome: Progressing     Problem: Pain  Goal: Verbalizes/displays adequate comfort level or baseline comfort level  Outcome: Progressing  Flowsheets  Taken 12/16/2024 0745 by Loren Retana RN  Verbalizes/displays adequate comfort level or baseline comfort level:   Assess pain using appropriate pain scale   Encourage patient to monitor pain and request assistance   Administer analgesics based on type and severity of pain and evaluate response   Implement non-pharmacological measures as appropriate and evaluate response   Consider cultural and social influences on pain and pain management   Notify Licensed Independent Practitioner if interventions unsuccessful or patient reports new pain  Taken 12/16/2024 1914

## 2024-12-16 NOTE — ASSESSMENT & PLAN NOTE
- Anion gap is 31.  Initial BG was 555  - Insulin drip, IVFs  - Q4H labs  - NPO except water, ice, meds

## 2024-12-16 NOTE — DIABETES MGMT
Patient seen for assessment regarding diabetes management.  Patient has a past medical history of Type 1 DM, borderline personality disorder, hypothyroidism, dyslipidemia, chronic pain, ADHD, PTSD. Patient states they were diagnosed at age 5. Patient states they do have a working glucometer with supplies at home. Per patient they typically check blood glucose levels multiple times a day. Per patient their blood glucose levels have been running  at home. Patient states they have a Dexcom G7 continuous glucose monitor. Patient states they are currently using a Mobi insulin pump for management of diabetes. Patient pump infusing Novolog.  Patient basal rates: Patient uses Control IQ so total daily basal dose may differ.  0000: 0.75  0300: 0.80  0900: 1.00  1400: 1.20  2100: 1.05   Total Daily Basal dose: 23.6 units per day  Carb ratio: 0000: 15  1200: 12  1700: 15  2100: 12  Insulin sensitivity: 70  Active insulin time 3 hours    Patient states yesterday noticed pump site malfunction with tubing kinked.  Patient states changed site however that site also malfunctioned.  Joanna dye was able to re-establish working insulin pump site but at that point was not feeling well and knew needed to go to ER.  Patient  has a pump failure plan, but does not have active prescriptions for basal insulin pens.  Discussed importance of utilizing pump failure plan for insulin pump set malfunction.  Patient states has all needed insulin pump supplies and Dexcom supplies with patient.  Reviewed current basal/bolus insulin regimen: Lantus 12 units daily, Humalog 4 units with meals, and Humalog correctional insulin low dose scale. Patient verbalizes understanding and voices no further questions regarding diabetes management.  Encouraged patient to follow up with primary care provider ([facility], Endocrinology), Patient states has an appointment in January.  Patient  plans to leave by 4 pm if not discharged by

## 2024-12-16 NOTE — DISCHARGE SUMMARY
Range    POC Glucose 471 (HH) 65 - 100 mg/dL    Performed by: Rosemarie    POCT Glucose    Collection Time: 12/15/24  5:30 PM   Result Value Ref Range    POC Glucose 332 (H) 65 - 100 mg/dL    Performed by: Rosemarie    POCT Glucose    Collection Time: 12/15/24  6:37 PM   Result Value Ref Range    POC Glucose 213 (H) 65 - 100 mg/dL    Performed by: Hermilo    POCT Glucose    Collection Time: 12/15/24  8:13 PM   Result Value Ref Range    POC Glucose 308 (H) 65 - 100 mg/dL    Performed by: Ellen    POCT Glucose    Collection Time: 12/15/24  9:43 PM   Result Value Ref Range    POC Glucose 358 (H) 65 - 100 mg/dL    Performed by: Ellen    POCT Glucose    Collection Time: 12/15/24 10:43 PM   Result Value Ref Range    POC Glucose 304 (H) 65 - 100 mg/dL    Performed by: Ellen    POCT Glucose    Collection Time: 12/15/24 11:50 PM   Result Value Ref Range    POC Glucose 285 (H) 65 - 100 mg/dL    Performed by: Ellen    Basic Metabolic Panel    Collection Time: 12/16/24 12:31 AM   Result Value Ref Range    Sodium 135 (L) 136 - 145 mmol/L    Potassium 4.4 3.5 - 5.1 mmol/L    Chloride 105 98 - 107 mmol/L    CO2 18 (L) 20 - 29 mmol/L    Anion Gap 12 7 - 16 mmol/L    Glucose 260 (H) 70 - 99 mg/dL    BUN 16 6 - 23 MG/DL    Creatinine 0.92 0.60 - 1.10 MG/DL    Est, Glom Filt Rate 88 >60 ml/min/1.73m2    Calcium 9.1 8.8 - 10.2 MG/DL   Magnesium    Collection Time: 12/16/24 12:31 AM   Result Value Ref Range    Magnesium 1.9 1.8 - 2.4 mg/dL   Phosphorus    Collection Time: 12/16/24 12:31 AM   Result Value Ref Range    Phosphorus 2.9 2.5 - 4.5 MG/DL   Hemoglobin A1c    Collection Time: 12/16/24 12:31 AM   Result Value Ref Range    Hemoglobin A1C 6.5 (H) 0 - 5.6 %    Estimated Avg Glucose 141 mg/dL   CBC with Auto Differential    Collection Time: 12/16/24 12:31 AM   Result Value Ref Range    WBC 15.3 (H) 4.3 - 11.1 K/uL    RBC 4.06 4.05 - 5.2 M/uL    Hemoglobin 11.7 11.7 - 15.4 g/dL

## 2024-12-16 NOTE — PLAN OF CARE
Problem: Chronic Conditions and Co-morbidities  Goal: Patient's chronic conditions and co-morbidity symptoms are monitored and maintained or improved  Outcome: Progressing     Problem: Discharge Planning  Goal: Discharge to home or other facility with appropriate resources  Outcome: Progressing  Flowsheets (Taken 12/15/2024 2016)  Discharge to home or other facility with appropriate resources: Identify barriers to discharge with patient and caregiver     Problem: Safety - Adult  Goal: Free from fall injury  Outcome: Progressing

## 2024-12-16 NOTE — PLAN OF CARE
Problem: Chronic Conditions and Co-morbidities  Goal: Patient's chronic conditions and co-morbidity symptoms are monitored and maintained or improved  12/16/2024 1303 by Loren Retana RN  Outcome: Adequate for Discharge  12/16/2024 1030 by Loren Retana RN  Outcome: Progressing  Flowsheets (Taken 12/16/2024 0745)  Care Plan - Patient's Chronic Conditions and Co-Morbidity Symptoms are Monitored and Maintained or Improved:   Monitor and assess patient's chronic conditions and comorbid symptoms for stability, deterioration, or improvement   Collaborate with multidisciplinary team to address chronic and comorbid conditions and prevent exacerbation or deterioration     Problem: Discharge Planning  Goal: Discharge to home or other facility with appropriate resources  12/16/2024 1303 by Loren Retana RN  Outcome: Adequate for Discharge  12/16/2024 1030 by Loren Retana RN  Outcome: Progressing  Flowsheets (Taken 12/16/2024 0745)  Discharge to home or other facility with appropriate resources:   Identify barriers to discharge with patient and caregiver   Arrange for needed discharge resources and transportation as appropriate   Identify discharge learning needs (meds, wound care, etc)   Refer to discharge planning if patient needs post-hospital services based on physician order or complex needs related to functional status, cognitive ability or social support system     Problem: Safety - Adult  Goal: Free from fall injury  12/16/2024 1303 by Loren Retana RN  Outcome: Adequate for Discharge  12/16/2024 1030 by Loren Retana RN  Outcome: Progressing     Problem: Pain  Goal: Verbalizes/displays adequate comfort level or baseline comfort level  12/16/2024 1303 by Loren Retana RN  Outcome: Adequate for Discharge  12/16/2024 1030 by Loren Retana RN  Outcome: Progressing  Flowsheets  Taken 12/16/2024 0745 by Loren Retana RN  Verbalizes/displays adequate comfort level or baseline comfort

## 2024-12-16 NOTE — PROGRESS NOTES
Critical Care Interdisciplinary Rounds with staff.     Kristy Little M.Div, [facility] / / Bereavement Coordinator  [facility] [place] Care Department   c: [phone]/ [phone] / [email or web address]  [email or web address]

## 2024-12-16 NOTE — H&P
Hospitalist History and Physical   Admit Date:  12/15/2024  8:05 PM   Name:  Negin Pham   Age:  29 y.o.  Sex:  female  :  1995   MRN:  [id number]     Presenting Complaint: high BG  Reason(s) for Admission: DKA, type 1, not at goal (HCC) [E10.10]     History of Present Illness:   Negin Pham is a 29 y.o. female with medical history of DM1 who presented to the [place] ED with concerns that she is in DKA.  She has an insulin pump, but noticed that her sugars have been reading high at home.  Reports associated nausea/vomiting, chest discomfort, dyspnea, and polyuria.  Upon ER evaluation, BG is 555.  CO2 is 7.  Anion gap is 31.  UA is negative for infection but has >160 ketones and 500 glucose.  Insulin drip and fluids started.  Hospitalist asked to admit.  Patient transferred to [facility] for further care.    Review of Systems:  10 systems reviewed and negative except as noted in HPI.  Assessment & Plan:   * DKA, type 1, not at goal (HCC)  Assessment & Plan  - Anion gap is 31.  Initial BG was 555  - Insulin drip, IVFs  - Q4H labs  - NPO except water, ice, meds    Borderline personality disorder (HCC)  Assessment & Plan  - Continue home meds      Disposition: inpatient    Patient is critically ill.  Without the interventions noted, there is a high probability of imminent acute organ impairment or life-threatening deterioration.  Total critical care time spent: 35 minutes.    Critical care time includes time spent at bedside performing history and exam, performing chart review, discussing findings and treatment plan with patient and/or family, discussing patient with consultants and colleagues, ordering and reviewing pertinent laboratory and radiographic evaluations, and discussing patient with nursing staff. Time excludes procedures.     Past medical history reviewed.    Past Medical History:   Diagnosis Date    ADHD (attention deficit hyperactivity disorder), inattentive type     Bipolar

## 2024-12-16 NOTE — PROGRESS NOTES
4 Eyes Skin Assessment     NAME:  Negin Pham  YOB: 1995  MEDICAL RECORD NUMBER:  [id number]    The patient is being assessed for  Admission    I agree that at least one RN has performed a thorough Head to Toe Skin Assessment on the patient. ALL assessment sites listed below have been assessed.      Areas assessed by both nurses:    Head, Face, Ears, Shoulders, Back, Chest, Arms, Elbows, Hands, Sacrum. Buttock, Coccyx, Ischium, Legs. Feet and Heels, and Under Medical Devices         Does the Patient have a Wound? No noted wound(s)       Jairo Prevention initiated by RN: No  Wound Care Orders initiated by RN: No    Pressure Injury (Stage 3,4, Unstageable, DTI, NWPT, and Complex wounds) if present, place Wound referral order by RN under : No    New Ostomies, if present place, Ostomy referral order under : No     Nurse 1 eSignature: Electronically signed by NEVILLE JENSEN RN on 12/16/24 at 12:28 AM EST    **SHARE this note so that the co-signing nurse can place an eSignature**    Nurse 2 eSignature: {Esignature:[id number]}

## 2024-12-16 NOTE — ED NOTES
TRANSFER - OUT REPORT:    Verbal report given to modesto Shine RN on Negin Pham  being transferred to [place] [address] for routine progression of patient care       Report consisted of patient's Situation, Background, Assessment and   Recommendations(SBAR).     Information from the following report(s) Nurse Handoff Report was reviewed with the receiving nurse.    Bighorn Fall Assessment:    Presents to emergency department  because of falls (Syncope, seizure, or loss of consciousness): No  Age > 70: No  Altered Mental Status, Intoxication with alcohol or substance confusion (Disorientation, impaired judgment, poor safety awaremess, or inability to follow instructions): No  Impaired Mobility: Ambulates or transfers with assistive devices or assistance; Unable to ambulate or transer.: No  Nursing Judgement: No          Lines:   Peripheral IV 12/15/24 Left;Posterior Hand (Active)   Site Assessment Clean, dry & intact 12/15/24 1443   Line Status Blood return noted;Flushed 12/15/24 1443   Phlebitis Assessment No symptoms 12/15/24 1443   Infiltration Assessment 0 12/15/24 1443   Dressing Status Clean, dry & intact;New dressing applied 12/15/24 1443   Dressing Type Transparent 12/15/24 1443   Dressing Intervention New 12/15/24 1443       Peripheral IV 12/15/24 Left;Anterior Forearm (Active)        Opportunity for questions and clarification was provided.      Patient transported with:  Patient-specific medications from Pharmacy

## 2024-12-16 NOTE — PROGRESS NOTES
Patient discharged to home. Discharge, medication, follow up, and prescription instructions given. Opportunity for questions provided and answers given. Patient verbalized understanding. Patient belongings packed and patient verified all belongings accounted for. Patient instructed to call out when ready to go and we would assist to front door.

## 2024-12-21 NOTE — ED NOTES
Attempt made with case management to contact pt post discharge to inquire about follow up care. Unable to reach pt. Vm left.      Pilar Georges, RN  12/21/24 5099

## 2025-01-07 ENCOUNTER — OFFICE VISIT (OUTPATIENT)
Age: 30
End: 2025-01-07
Payer: COMMERCIAL

## 2025-01-07 VITALS
BODY MASS INDEX: 21.83 KG/M2 | HEIGHT: 62 IN | DIASTOLIC BLOOD PRESSURE: 60 MMHG | SYSTOLIC BLOOD PRESSURE: 104 MMHG | HEART RATE: 60 BPM | WEIGHT: 118.6 LBS

## 2025-01-07 DIAGNOSIS — I95.1 ORTHOSTATIC HYPOTENSION: ICD-10-CM

## 2025-01-07 DIAGNOSIS — R00.0 TACHYCARDIA: Primary | ICD-10-CM

## 2025-01-07 DIAGNOSIS — E78.2 MIXED HYPERLIPIDEMIA: ICD-10-CM

## 2025-01-07 DIAGNOSIS — Z82.49 FAMILY HISTORY OF CARDIOMYOPATHY: ICD-10-CM

## 2025-01-07 DIAGNOSIS — R07.2 PRECORDIAL CHEST PAIN: ICD-10-CM

## 2025-01-07 DIAGNOSIS — Z87.898 HISTORY OF SYNCOPE: ICD-10-CM

## 2025-01-07 PROCEDURE — G8420 CALC BMI NORM PARAMETERS: HCPCS | Performed by: INTERNAL MEDICINE

## 2025-01-07 PROCEDURE — G8427 DOCREV CUR MEDS BY ELIG CLIN: HCPCS | Performed by: INTERNAL MEDICINE

## 2025-01-07 PROCEDURE — 99214 OFFICE O/P EST MOD 30 MIN: CPT | Performed by: INTERNAL MEDICINE

## 2025-01-07 PROCEDURE — 1036F TOBACCO NON-USER: CPT | Performed by: INTERNAL MEDICINE

## 2025-01-07 PROCEDURE — M1308 PR FLU IMMUNIZE NO ADMIN: HCPCS | Performed by: INTERNAL MEDICINE

## 2025-01-07 PROCEDURE — 1111F DSCHRG MED/CURRENT MED MERGE: CPT | Performed by: INTERNAL MEDICINE

## 2025-01-07 ASSESSMENT — ENCOUNTER SYMPTOMS
DOUBLE VISION: 0
HOARSE VOICE: 0
STRIDOR: 0
HEMOPTYSIS: 0
HEMATOCHEZIA: 0
HEMATEMESIS: 0
EYE REDNESS: 0
WHEEZING: 0
ABDOMINAL PAIN: 0

## 2025-01-07 NOTE — PROGRESS NOTES
murmurs rubs or gallops.   Respiratory: Clear to auscultation bilaterally with no adventitious sounds, respirations are normal  Abdomen: Soft, nontender, nondistended, bowel sounds present.  Extremities: No cyanosis clubbing or edema  Peripheral pulses: Bilateral radial artery pulses are palpated.  Bilateral pedal pulses are well felt.  Neuro: No facial droop and no gross focal motor deficits  Lymphatic: No significant cervical lymphadenopathy noted.  Musculoskeletal: No significant redness or swelling noted in all exposed joints.  Skin: No significant rashes noted the of the exposed regions.       Medical problems and test results were reviewed with the patient today.       Lab Results   Component Value Date/Time    CHOL 145 10/23/2024 03:37 PM    HDL 56 10/23/2024 03:37 PM    LDL 72 10/23/2024 03:37 PM    LDL 79.6 04/01/2024 03:17 PM    VLDL 17 10/23/2024 03:37 PM   ,hemoglobin, basic metabolic panel,   Lab Results   Component Value Date/Time    TSH 2.860 10/23/2024 03:37 PM    ,  Lab Results   Component Value Date/Time     12/16/2024 08:29 AM    K 4.1 12/16/2024 08:29 AM     12/16/2024 08:29 AM    CO2 17 12/16/2024 08:29 AM    BUN 11 12/16/2024 08:29 AM    GLOB 3.0 12/15/2024 02:37 PM    ALT 5 12/15/2024 02:37 PM    AST 18 12/15/2024 02:37 PM      No results found for: \"LDLDIRECT\"   Lab Results   Component Value Date    CREATININE 0.72 12/16/2024      Lab Results   Component Value Date    HGB 11.7 12/16/2024        Lab Results   Component Value Date     12/16/2024        EKG from 3/28/2024 showed sinus rhythm at 84 bpm, normal intervals, normal axis-personally reviewed with her.      ASSESSMENT and PLAN        Orthostatic hypotension  -Liberalize salt and fluid intake.  Holter monitor showed no significant arrhythmia.  Getting echo to rule out structural and valvular heart disease/pericardial effusion    History of syncope  14-day Holter monitor showed no significant arrhythmia/pauses.  Syncope

## 2025-01-16 ASSESSMENT — ENCOUNTER SYMPTOMS
CONSTIPATION: 0
DIARRHEA: 0

## 2025-01-16 NOTE — PROGRESS NOTES
hyperglycemia. Attributes to site failure.     Barriers to care: none identified    Diabetes Mellitus    Date of diagnosis: 2000 at age 5    Patient has a history of 11+ previous DKA episodes.  Denies HX of pancreatitis, foot ulcer  Hx of anemia.   Delayed gastric emptying via emptying study 09/2020    Current Regimen: Novolog via AID     Failed therapies: reports sensitivity to insulin    Pump: Tandem Mobi    Pump issues: none    Pump Data:    TDD: 36.87 units  Basal: 18.83  units: 51%  Bolus: 18.04 units, 49%  Food: 8.57 units, 47%; Correction: 1.00 units, 6%; Control IQ: 5.78 units, 32%, Overrides: 2.69 units, 15%  Control IQ off: 0%; Pump inactive: 5%, CGM inactive: 5%    Pump settings:      Basal Rates 0000: 0.75, 0300: 0.80, 0900: 1.00, 1200: 1.20, 2100: 1.05  Carb Ratio 0000: 15, 1200: 12, 1700: 15, 2100: 12  Correction Factor: 0000: 70  Targets  0000: 100  Active Insulin time 3 hrs  Max Bolus 20 units  Control IQ: weight: 127; TDD: 37    Home blood glucose monitoring frequency:     By review of Dexcom G7 CGM download over past 30 days (active sensor time: 94.2%)  Average blood glucose 142 with a COV of 53.6%  Time in range 65%  High 15%, Very High 8%  Low 7%, Very Low 5%     Typical Standard Deviation   0600 154 67   1200 140 85   1800 151 74   2200 134 76     Diet: eats maybe once a day    Exercise:  active with kids    Pregnancy: Patient has no plans for pregnancy and is aware of complications associated with pregnancy and uncontrolled diabetes. Advised A1c goal less than 6.5 prior to becoming pregnant. Contraceptive method: IUD    Diabetes education: The patient has received formal diabetes education.    Diabetic complications:     Retinopathy: Last eye exam was around ~2022 and demonstrated no diabetic retinopathy.  Eye care specialist is unknown. Has an appointment scheduled in February.    Albuminuria/nephropathy: none     Neuropathy: potential autonomic neuropathy (orthostatic hypotension), poor

## 2025-01-17 ENCOUNTER — OFFICE VISIT (OUTPATIENT)
Dept: ENDOCRINOLOGY | Age: 30
End: 2025-01-17

## 2025-01-17 VITALS
WEIGHT: 115.4 LBS | HEART RATE: 67 BPM | DIASTOLIC BLOOD PRESSURE: 64 MMHG | OXYGEN SATURATION: 99 % | SYSTOLIC BLOOD PRESSURE: 96 MMHG | HEIGHT: 62 IN | BODY MASS INDEX: 21.23 KG/M2

## 2025-01-17 DIAGNOSIS — Z96.41 INSULIN PUMP STATUS: Chronic | ICD-10-CM

## 2025-01-17 DIAGNOSIS — E10.65 TYPE 1 DIABETES MELLITUS WITH HYPERGLYCEMIA (HCC): Primary | ICD-10-CM

## 2025-01-17 RX ORDER — INSULIN ASPART 100 [IU]/ML
INJECTION, SOLUTION INTRAVENOUS; SUBCUTANEOUS
Qty: 60 ML | Refills: 3 | Status: SHIPPED | OUTPATIENT
Start: 2025-01-17

## 2025-01-17 NOTE — ASSESSMENT & PLAN NOTE
A1c 6.5% on 12/16/2024, GMI: 6.7%. Glycemic control sub-optimal with A1c at goal of less than 7%. She is spending far too much time hypoglycemic. Reports it usually happens after a Control IQ bolus but also happens post-prandially.   Weaken ICR to 77 and carb ratio to 18.   Recent labs drawn by hospital reviewed with patient.  Patient is overdue for her annual diabetic eye exam. Counseled on optimizing glycemic control, blood pressure and cholesterol to reduce risk or slow progression of diabetic retinopathy. They have an upcoming appointment.

## 2025-03-13 NOTE — PROGRESS NOTES
FYI - Status Update    Who is Calling: patient    Update: He got a message from his PCP for a phone call tomorrow morning at 7:30 and he wasn't able to respond in my chart but is saying that it will work for him to call at 7:30    Does caller want a call/response back: Yes     Could we send this information to you in Seer or would you prefer to receive a phone call?:   Patient would prefer a phone call   Okay to leave a detailed message?: Yes at Cell number on file:    Telephone Information:   Mobile [phone]       Negin Pham (:  1995) is a 28 y.o. female new patient referred to our office for evaluation of the following chief complaint(s):  New Patient           ASSESSMENT/PLAN:  1. Delayed gastric emptying  2. Epigastric pain  3. Chronic nausea  -     ondansetron (ZOFRAN) 4 MG tablet; Take 1 tablet by mouth daily as needed for Nausea or Vomiting, Disp-30 tablet, R-1Normal  4. Bowel habit changes  5. Mucus in stool       Continue Nexium 40 mg QD. Counseled patient and provided written recommendations for diet and lifestyle modifications for GERD. Avoid tobacco, alcohol, NSAIDs. Recommend more frequent, smaller meals for hx of delayed gastric emptying in setting of longstanding T1DM. Discontinue all cannabis-related products. Will give PRN Zofran refill at her request. Schedule EGD and simultaneous colonoscopy for bowel habit changes with alternating constipation and diarrhea with mucus in stool. Follow-up after procedure to discuss results and IBS management if colonoscopy is normal. Can consider repeat GES if EGD is unremarkable though doubt findings will .     Subjective   SUBJECTIVE/OBJECTIVE  Negin Pham is a 28 y.o. year old female with PMH is pertinent for HPL, T1DM (diagnosed at age 5), DRAKE, PTSD, depression, anxiety, chronic pain, borderline personality disorder, hypothyroidism.     Patient was referred to our office for evaluation of diabetic gastroparesis.  Referral note reviewed from 2024. She is transferring all her care to [facility] due to  insurance issues. Prior pertinent GI evaluation includes:     -EGD 2016 (Dr. Madrid): normal; duodenal biopsies with no histopathologic change, stomach biopsy showed chronic gastritis, negative H. pylori  -GES 9/3/2020: 90% retention at 60 minutes, 34% retention at 120 minutes, 30% retention at 180 minutes, 20% retention at 240 minutes consistent with mild gastric retention  -EGD 10/21/2020 (Dr. Madrid): normal esophagus s/p

## 2025-04-01 PROBLEM — Z86.39 HISTORY OF DIABETIC KETOACIDOSIS: Status: ACTIVE | Noted: 2024-12-15

## 2025-04-01 ASSESSMENT — ENCOUNTER SYMPTOMS
DIARRHEA: 0
CONSTIPATION: 0

## 2025-04-01 NOTE — PROGRESS NOTES
[facility], [facility]  [address], [address]  [place] [address]            Negin hPam is seen today for follow up of type 1 diabetes mellitus.      ASSESSMENT AND PLAN:    Interpretation of 72 hour glucose monitor:  At least 72 hours of data were reviewed.  The patient utilizes a Dexcom G7 continuous glucose monitoring system.  The average glucose during the reviewed timeframe was 145 with a coefficient of variation of 51.2% (time in range 70%, time above range 21%, time below range 9%).     1. Type 1 diabetes mellitus with hyperglycemia (HCC)  Assessment & Plan:  A1c 5.8%, GMI: 6.8%. Glycemic control sub-optimal with A1c at goal of less than 7%.  She is having a significant amount of hypoglycemia throughout the day, most of which occur after a control IQ bolus. Lowered basal rates from 2305-2509 and weakened correction factor to 84.   Patient is overdue for her annual diabetic eye exam. Counseled on optimizing glycemic control, blood pressure and cholesterol to reduce risk or slow progression of diabetic retinopathy. Advised to schedule an appointment with opthalmology.     Orders:  -     AMB POC HEMOGLOBIN A1C  -     GLUCOSE MONITOR, 72 HOUR, PHYS INTERP  2. Insulin pump status  Assessment & Plan:  Pump setting changes as below. Long-acting insulin on chart for pump failure.    Orders:  -     Insulin Infusion Pump ISABELLA; Disp-1 each, R-0, Adjust SigBasal Rates 0000: 0.7, 0300: 0.75, 0900: 1, 1200: 1.20, 2100: 1.05 Carb Ratio 0000: 18 Correction Factor: 0000: 84 Targets  0000: 110 Active Insulin time 5 hrs Max Bolus 15 units Basal limit: 3 u/hr Control IQ: weight: 115; TDD: 37            Return in about 3 months (around 7/2/2025).     History of Present Illness:    Interim medical updates: having a significant amount of hypoglycemia    Barriers to care: none identified    Diabetes Mellitus    Date of diagnosis: 2000 at age 5    Patient has a history of 11+

## 2025-04-02 ENCOUNTER — OFFICE VISIT (OUTPATIENT)
Dept: ENDOCRINOLOGY | Age: 30
End: 2025-04-02
Payer: COMMERCIAL

## 2025-04-02 VITALS
OXYGEN SATURATION: 99 % | HEART RATE: 71 BPM | BODY MASS INDEX: 21.12 KG/M2 | WEIGHT: 114.8 LBS | SYSTOLIC BLOOD PRESSURE: 100 MMHG | HEIGHT: 62 IN | DIASTOLIC BLOOD PRESSURE: 70 MMHG

## 2025-04-02 DIAGNOSIS — Z96.41 INSULIN PUMP STATUS: Chronic | ICD-10-CM

## 2025-04-02 DIAGNOSIS — E10.65 TYPE 1 DIABETES MELLITUS WITH HYPERGLYCEMIA (HCC): Primary | ICD-10-CM

## 2025-04-02 LAB — HBA1C MFR BLD: 5.8 %

## 2025-04-02 PROCEDURE — 2022F DILAT RTA XM EVC RTNOPTHY: CPT | Performed by: NURSE PRACTITIONER

## 2025-04-02 PROCEDURE — 1036F TOBACCO NON-USER: CPT | Performed by: NURSE PRACTITIONER

## 2025-04-02 PROCEDURE — 99214 OFFICE O/P EST MOD 30 MIN: CPT | Performed by: NURSE PRACTITIONER

## 2025-04-02 PROCEDURE — G8420 CALC BMI NORM PARAMETERS: HCPCS | Performed by: NURSE PRACTITIONER

## 2025-04-02 PROCEDURE — 3046F HEMOGLOBIN A1C LEVEL >9.0%: CPT | Performed by: NURSE PRACTITIONER

## 2025-04-02 PROCEDURE — G8427 DOCREV CUR MEDS BY ELIG CLIN: HCPCS | Performed by: NURSE PRACTITIONER

## 2025-04-02 PROCEDURE — 3044F HG A1C LEVEL LT 7.0%: CPT | Performed by: NURSE PRACTITIONER

## 2025-04-02 PROCEDURE — 95251 CONT GLUC MNTR ANALYSIS I&R: CPT | Performed by: NURSE PRACTITIONER

## 2025-04-02 PROCEDURE — 83036 HEMOGLOBIN GLYCOSYLATED A1C: CPT | Performed by: NURSE PRACTITIONER

## 2025-04-02 NOTE — ASSESSMENT & PLAN NOTE
A1c 5.8%, GMI: 6.8%. Glycemic control sub-optimal with A1c at goal of less than 7%.  She is having a significant amount of hypoglycemia throughout the day, most of which occur after a control IQ bolus. Lowered basal rates from 0407-8969 and weakened correction factor to 84.   Patient is overdue for her annual diabetic eye exam. Counseled on optimizing glycemic control, blood pressure and cholesterol to reduce risk or slow progression of diabetic retinopathy. Advised to schedule an appointment with opthalmology.

## 2025-06-05 ENCOUNTER — OFFICE VISIT (OUTPATIENT)
Dept: FAMILY MEDICINE CLINIC | Facility: CLINIC | Age: 30
End: 2025-06-05
Payer: COMMERCIAL

## 2025-06-05 VITALS
TEMPERATURE: 97.9 F | BODY MASS INDEX: 21.16 KG/M2 | SYSTOLIC BLOOD PRESSURE: 120 MMHG | OXYGEN SATURATION: 99 % | WEIGHT: 115 LBS | HEART RATE: 87 BPM | DIASTOLIC BLOOD PRESSURE: 78 MMHG | HEIGHT: 62 IN | RESPIRATION RATE: 18 BRPM

## 2025-06-05 DIAGNOSIS — Z00.00 ROUTINE GENERAL MEDICAL EXAMINATION AT A HEALTH CARE FACILITY: ICD-10-CM

## 2025-06-05 DIAGNOSIS — Z00.00 ROUTINE GENERAL MEDICAL EXAMINATION AT A HEALTH CARE FACILITY: Primary | ICD-10-CM

## 2025-06-05 DIAGNOSIS — E03.9 PRIMARY HYPOTHYROIDISM: ICD-10-CM

## 2025-06-05 LAB
ALBUMIN SERPL-MCNC: 4 G/DL (ref 3.5–5)
ALBUMIN/GLOB SERPL: 1.3 (ref 1–1.9)
ALP SERPL-CCNC: 55 U/L (ref 35–104)
ALT SERPL-CCNC: 8 U/L (ref 8–45)
ANION GAP SERPL CALC-SCNC: 11 MMOL/L (ref 7–16)
AST SERPL-CCNC: 23 U/L (ref 15–37)
BASOPHILS # BLD: 0.06 K/UL (ref 0–0.2)
BASOPHILS NFR BLD: 1.1 % (ref 0–2)
BILIRUB SERPL-MCNC: 0.5 MG/DL (ref 0–1.2)
BUN SERPL-MCNC: 7 MG/DL (ref 6–23)
CALCIUM SERPL-MCNC: 10.1 MG/DL (ref 8.8–10.2)
CHLORIDE SERPL-SCNC: 106 MMOL/L (ref 98–107)
CHOLEST SERPL-MCNC: 150 MG/DL (ref 0–200)
CO2 SERPL-SCNC: 21 MMOL/L (ref 20–29)
CREAT SERPL-MCNC: 0.8 MG/DL (ref 0.6–1.1)
DIFFERENTIAL METHOD BLD: NORMAL
EOSINOPHIL # BLD: 0.05 K/UL (ref 0–0.8)
EOSINOPHIL NFR BLD: 0.9 % (ref 0.5–7.8)
ERYTHROCYTE [DISTWIDTH] IN BLOOD BY AUTOMATED COUNT: 13.2 % (ref 11.9–14.6)
GLOBULIN SER CALC-MCNC: 3.1 G/DL (ref 2.3–3.5)
GLUCOSE SERPL-MCNC: 104 MG/DL (ref 70–99)
HCT VFR BLD AUTO: 43.3 % (ref 35.8–46.3)
HDLC SERPL-MCNC: 69 MG/DL (ref 40–60)
HDLC SERPL: 2.2 (ref 0–5)
HGB BLD-MCNC: 13.9 G/DL (ref 11.7–15.4)
IMM GRANULOCYTES # BLD AUTO: 0.01 K/UL (ref 0–0.5)
IMM GRANULOCYTES NFR BLD AUTO: 0.2 % (ref 0–5)
LDLC SERPL CALC-MCNC: 74 MG/DL (ref 0–100)
LYMPHOCYTES # BLD: 1.85 K/UL (ref 0.5–4.6)
LYMPHOCYTES NFR BLD: 34.6 % (ref 13–44)
MCH RBC QN AUTO: 29 PG (ref 26.1–32.9)
MCHC RBC AUTO-ENTMCNC: 32.1 G/DL (ref 31.4–35)
MCV RBC AUTO: 90.2 FL (ref 82–102)
MONOCYTES # BLD: 0.28 K/UL (ref 0.1–1.3)
MONOCYTES NFR BLD: 5.2 % (ref 4–12)
NEUTS SEG # BLD: 3.1 K/UL (ref 1.7–8.2)
NEUTS SEG NFR BLD: 58 % (ref 43–78)
NRBC # BLD: 0 K/UL (ref 0–0.2)
PLATELET # BLD AUTO: 288 K/UL (ref 150–450)
PMV BLD AUTO: 11.2 FL (ref 9.4–12.3)
POTASSIUM SERPL-SCNC: 4 MMOL/L (ref 3.5–5.1)
PROT SERPL-MCNC: 7.1 G/DL (ref 6.3–8.2)
RBC # BLD AUTO: 4.8 M/UL (ref 4.05–5.2)
SODIUM SERPL-SCNC: 139 MMOL/L (ref 136–145)
T4 FREE SERPL-MCNC: 0.8 NG/DL (ref 0.9–1.7)
TRIGL SERPL-MCNC: 35 MG/DL (ref 0–150)
TSH, 3RD GENERATION: 2.72 UIU/ML (ref 0.27–4.2)
VLDLC SERPL CALC-MCNC: 7 MG/DL (ref 6–23)
WBC # BLD AUTO: 5.4 K/UL (ref 4.3–11.1)

## 2025-06-05 PROCEDURE — 99395 PREV VISIT EST AGE 18-39: CPT | Performed by: NURSE PRACTITIONER

## 2025-06-05 SDOH — ECONOMIC STABILITY: FOOD INSECURITY: WITHIN THE PAST 12 MONTHS, YOU WORRIED THAT YOUR FOOD WOULD RUN OUT BEFORE YOU GOT MONEY TO BUY MORE.: NEVER TRUE

## 2025-06-05 SDOH — ECONOMIC STABILITY: FOOD INSECURITY: WITHIN THE PAST 12 MONTHS, THE FOOD YOU BOUGHT JUST DIDN'T LAST AND YOU DIDN'T HAVE MONEY TO GET MORE.: NEVER TRUE

## 2025-06-05 ASSESSMENT — PATIENT HEALTH QUESTIONNAIRE - PHQ9
SUM OF ALL RESPONSES TO PHQ QUESTIONS 1-9: 0
8. MOVING OR SPEAKING SO SLOWLY THAT OTHER PEOPLE COULD HAVE NOTICED. OR THE OPPOSITE - BEING SO FIDGETY OR RESTLESS THAT YOU HAVE BEEN MOVING AROUND A LOT MORE THAN USUAL: NOT AT ALL
3. TROUBLE FALLING OR STAYING ASLEEP: NOT AT ALL
1. LITTLE INTEREST OR PLEASURE IN DOING THINGS: NOT AT ALL
9. THOUGHTS THAT YOU WOULD BE BETTER OFF DEAD, OR OF HURTING YOURSELF: NOT AT ALL
SUM OF ALL RESPONSES TO PHQ QUESTIONS 1-9: 0
2. FEELING DOWN, DEPRESSED OR HOPELESS: NOT AT ALL
6. FEELING BAD ABOUT YOURSELF - OR THAT YOU ARE A FAILURE OR HAVE LET YOURSELF OR YOUR FAMILY DOWN: NOT AT ALL
4. FEELING TIRED OR HAVING LITTLE ENERGY: NOT AT ALL
10. IF YOU CHECKED OFF ANY PROBLEMS, HOW DIFFICULT HAVE THESE PROBLEMS MADE IT FOR YOU TO DO YOUR WORK, TAKE CARE OF THINGS AT HOME, OR GET ALONG WITH OTHER PEOPLE: NOT DIFFICULT AT ALL
6. FEELING BAD ABOUT YOURSELF - OR THAT YOU ARE A FAILURE OR HAVE LET YOURSELF OR YOUR FAMILY DOWN: NOT AT ALL
10. IF YOU CHECKED OFF ANY PROBLEMS, HOW DIFFICULT HAVE THESE PROBLEMS MADE IT FOR YOU TO DO YOUR WORK, TAKE CARE OF THINGS AT HOME, OR GET ALONG WITH OTHER PEOPLE: NOT DIFFICULT AT ALL
SUM OF ALL RESPONSES TO PHQ QUESTIONS 1-9: 0
9. THOUGHTS THAT YOU WOULD BE BETTER OFF DEAD, OR OF HURTING YOURSELF: NOT AT ALL
SUM OF ALL RESPONSES TO PHQ QUESTIONS 1-9: 0
5. POOR APPETITE OR OVEREATING: NOT AT ALL
SUM OF ALL RESPONSES TO PHQ QUESTIONS 1-9: 0
7. TROUBLE CONCENTRATING ON THINGS, SUCH AS READING THE NEWSPAPER OR WATCHING TELEVISION: NOT AT ALL
1. LITTLE INTEREST OR PLEASURE IN DOING THINGS: NOT AT ALL
7. TROUBLE CONCENTRATING ON THINGS, SUCH AS READING THE NEWSPAPER OR WATCHING TELEVISION: NOT AT ALL
2. FEELING DOWN, DEPRESSED OR HOPELESS: NOT AT ALL
8. MOVING OR SPEAKING SO SLOWLY THAT OTHER PEOPLE COULD HAVE NOTICED. OR THE OPPOSITE, BEING SO FIGETY OR RESTLESS THAT YOU HAVE BEEN MOVING AROUND A LOT MORE THAN USUAL: NOT AT ALL
4. FEELING TIRED OR HAVING LITTLE ENERGY: NOT AT ALL
3. TROUBLE FALLING OR STAYING ASLEEP: NOT AT ALL
5. POOR APPETITE OR OVEREATING: NOT AT ALL

## 2025-06-05 ASSESSMENT — ENCOUNTER SYMPTOMS
RESPIRATORY NEGATIVE: 1
GASTROINTESTINAL NEGATIVE: 1

## 2025-06-05 NOTE — PROGRESS NOTES
12:31 AM       The ASCVD Risk score (Kristian MARINO, et al., 2019) failed to calculate for the following reasons:    The 2019 ASCVD risk score is only valid for ages 40 to 79    Immunization History   Administered Date(s) Administered    HPV Quadrivalent (Gardasil) 05/15/2014, 01/29/2015, 06/01/2015    Influenza Virus Vaccine 09/05/2017, 09/30/2020, 10/28/2021    Influenza, AFLURIA, FLUZONE, (age2 y+), IM, Trivalent MDV, 0.5mL 01/21/2014    Influenza, FLUBLOK, (age 18 y+), Quadv PF, 0.5mL 09/23/2019    PPD Test 09/11/2019    TDaP, ADACEL (age 10y-64y), BOOSTRIX (age 10y+), IM, 0.5mL 09/05/2017, 03/17/2022       Health Maintenance   Topic Date Due    Varicella vaccine (1 of 2 - 13+ 2-dose series) Never done    HIV screen  Never done    Diabetic retinal exam  Never done    Hepatitis C screen  Never done    Hepatitis B vaccine (1 of 3 - 19+ 3-dose series) Never done    Pneumococcal 0-49 years Vaccine (1 of 2 - PCV) Never done    COVID-19 Vaccine (1 - 2024-25 season) Never done    Diabetic foot exam  04/11/2025    Flu vaccine (Season Ended) 08/01/2025    Lipids  10/23/2025    Diabetic Alb to Cr ratio (uACR) test  10/29/2025    GFR test (Diabetes, CKD 3-4, OR last GFR 15-59)  12/16/2025    A1C test (Diabetic or Prediabetic)  04/02/2026    Depression Monitoring  06/05/2026    DTaP/Tdap/Td vaccine (3 - Td or Tdap) 03/17/2032    HPV vaccine  Completed    Hepatitis A vaccine  Aged Out    Hib vaccine  Aged Out    Polio vaccine  Aged Out    Meningococcal (ACWY) vaccine  Aged Out    Meningococcal B vaccine  Aged Out    Depression Screen  Discontinued    Pap smear  Discontinued           Assessment & Plan  Routine general medical examination at a health care facility       Orders:    CBC with Auto Differential; Future    Comprehensive Metabolic Panel; Future    Lipid Panel; Future      Assessment & Plan  1. Annual physical examination.  - Blood pressure readings are within the normal range today at 120/78.  - Advised to continue 
18-Oct-2019 10:28

## 2025-06-06 ENCOUNTER — RESULTS FOLLOW-UP (OUTPATIENT)
Dept: FAMILY MEDICINE CLINIC | Facility: CLINIC | Age: 30
End: 2025-06-06

## 2025-07-09 ENCOUNTER — OFFICE VISIT (OUTPATIENT)
Age: 30
End: 2025-07-09
Payer: COMMERCIAL

## 2025-07-09 VITALS
WEIGHT: 112.8 LBS | BODY MASS INDEX: 20.63 KG/M2 | SYSTOLIC BLOOD PRESSURE: 102 MMHG | DIASTOLIC BLOOD PRESSURE: 60 MMHG | HEART RATE: 76 BPM

## 2025-07-09 DIAGNOSIS — E78.2 MIXED HYPERLIPIDEMIA: ICD-10-CM

## 2025-07-09 DIAGNOSIS — Z82.49 FAMILY HISTORY OF CARDIOMYOPATHY: ICD-10-CM

## 2025-07-09 DIAGNOSIS — I95.1 ORTHOSTATIC HYPOTENSION: ICD-10-CM

## 2025-07-09 DIAGNOSIS — R07.2 PRECORDIAL CHEST PAIN: ICD-10-CM

## 2025-07-09 DIAGNOSIS — R00.0 TACHYCARDIA: Primary | ICD-10-CM

## 2025-07-09 PROCEDURE — G8420 CALC BMI NORM PARAMETERS: HCPCS | Performed by: INTERNAL MEDICINE

## 2025-07-09 PROCEDURE — 1036F TOBACCO NON-USER: CPT | Performed by: INTERNAL MEDICINE

## 2025-07-09 PROCEDURE — G8427 DOCREV CUR MEDS BY ELIG CLIN: HCPCS | Performed by: INTERNAL MEDICINE

## 2025-07-09 PROCEDURE — 99214 OFFICE O/P EST MOD 30 MIN: CPT | Performed by: INTERNAL MEDICINE

## 2025-07-09 ASSESSMENT — ENCOUNTER SYMPTOMS
HEMATEMESIS: 0
EYE REDNESS: 0
HOARSE VOICE: 0
HEMATOCHEZIA: 0
WHEEZING: 0
STRIDOR: 0
DOUBLE VISION: 0
HEMOPTYSIS: 0
ABDOMINAL PAIN: 0

## 2025-07-09 NOTE — PROGRESS NOTES
admissions lately.     On this date I have spent 35 minutes personally reviewing previous notes/outside records, test results and face to face time with the patient discussing the diagnosis and importance of compliance with the treatment plan as well as documenting on the day of the visit.       Elements of this note have been dictated using speech recognition software. As a result, errors of speech recognition may have occurred.    Return in about 6 months (around 1/9/2026) for Orthostatic hypotension, syncope, tachycardia, hyperlipidemia.     Thank you for allowing us to participate in the care of your patient.  If you have any further questions, please do not hesitate to contact us.  Sincerely,        Medhat Conley MD   7/9/2025

## 2025-08-14 ENCOUNTER — OFFICE VISIT (OUTPATIENT)
Dept: ENDOCRINOLOGY | Age: 30
End: 2025-08-14
Payer: COMMERCIAL

## 2025-08-14 VITALS
BODY MASS INDEX: 21.27 KG/M2 | SYSTOLIC BLOOD PRESSURE: 110 MMHG | DIASTOLIC BLOOD PRESSURE: 64 MMHG | WEIGHT: 115.6 LBS | OXYGEN SATURATION: 99 % | HEIGHT: 62 IN | HEART RATE: 99 BPM

## 2025-08-14 DIAGNOSIS — E03.9 PRIMARY HYPOTHYROIDISM: ICD-10-CM

## 2025-08-14 DIAGNOSIS — E10.65 TYPE 1 DIABETES MELLITUS WITH HYPERGLYCEMIA (HCC): Primary | ICD-10-CM

## 2025-08-14 DIAGNOSIS — Z96.41 INSULIN PUMP STATUS: Chronic | ICD-10-CM

## 2025-08-14 LAB — HBA1C MFR BLD: 5.7 %

## 2025-08-14 PROCEDURE — 3046F HEMOGLOBIN A1C LEVEL >9.0%: CPT | Performed by: NURSE PRACTITIONER

## 2025-08-14 PROCEDURE — 83036 HEMOGLOBIN GLYCOSYLATED A1C: CPT | Performed by: NURSE PRACTITIONER

## 2025-08-14 PROCEDURE — 95251 CONT GLUC MNTR ANALYSIS I&R: CPT | Performed by: NURSE PRACTITIONER

## 2025-08-14 PROCEDURE — 2022F DILAT RTA XM EVC RTNOPTHY: CPT | Performed by: NURSE PRACTITIONER

## 2025-08-14 PROCEDURE — 99214 OFFICE O/P EST MOD 30 MIN: CPT | Performed by: NURSE PRACTITIONER

## 2025-08-14 PROCEDURE — G8420 CALC BMI NORM PARAMETERS: HCPCS | Performed by: NURSE PRACTITIONER

## 2025-08-14 PROCEDURE — 1036F TOBACCO NON-USER: CPT | Performed by: NURSE PRACTITIONER

## 2025-08-14 PROCEDURE — G8427 DOCREV CUR MEDS BY ELIG CLIN: HCPCS | Performed by: NURSE PRACTITIONER

## 2025-08-14 PROCEDURE — 3044F HG A1C LEVEL LT 7.0%: CPT | Performed by: NURSE PRACTITIONER

## 2025-08-14 RX ORDER — GLUCAGON 3 MG/1
3 POWDER NASAL PRN
Qty: 1 EACH | Refills: 2 | Status: SHIPPED | OUTPATIENT
Start: 2025-08-14

## 2025-08-14 ASSESSMENT — ENCOUNTER SYMPTOMS
CONSTIPATION: 0
DIARRHEA: 0

## 2025-08-25 DIAGNOSIS — I95.1 ORTHOSTATIC HYPOTENSION: ICD-10-CM

## 2025-08-25 LAB — CORTIS AM PEAK SERPL-MCNC: 10.6 UG/DL (ref 4.8–19.5)

## (undated) DEVICE — SUTURE MONOCRYL SZ 4-0 L27IN ABSRB UD L19MM PS-2 1/2 CIR PRIM Y426H

## (undated) DEVICE — BASIC SINGLE BASIN-LF: Brand: MEDLINE INDUSTRIES, INC.

## (undated) DEVICE — SUTURE ABSORBABLE MONOFILAMENT 0 CT-1 36 MM DYED SPRL PDS + SXPP1B450

## (undated) DEVICE — KENDALL RADIOLUCENT FOAM MONITORING ELECTRODE RECTANGULAR SHAPE: Brand: KENDALL

## (undated) DEVICE — ROBOTIC HYSTERECTOMY: Brand: MEDLINE INDUSTRIES, INC.

## (undated) DEVICE — ROBOTIC DRAPE WITH LEGGINGS: Brand: CONVERTORS

## (undated) DEVICE — SYRINGE MED 10ML LUERLOCK TIP W/O SFTY DISP

## (undated) DEVICE — PUMP SUC IRR TBNG L10FT W/ HNDPC ASSEMB STRYKEFLOW 2

## (undated) DEVICE — TIP IU L8CM DIA6.7MM BLU SIL FLX DISP RUMI II

## (undated) DEVICE — FORCEPS BX L240CM JAW DIA2.8MM L CAP W/ NDL MIC MESH TOOTH

## (undated) DEVICE — GLOVE SURG SZ 65 L12IN FNGR THK79MIL GRN LTX FREE

## (undated) DEVICE — CANISTER, RIGID, 2000CC: Brand: MEDLINE INDUSTRIES, INC.

## (undated) DEVICE — CONTAINER FORMALIN PREFILLED 10% NBF 60ML

## (undated) DEVICE — BARRIER ADH W3XL4IN UTER PELV ABSRB GYNECARE INTCEED

## (undated) DEVICE — ARM DRAPE

## (undated) DEVICE — GARMENT,MEDLINE,DVT,INT,CALF,MED, GEN2: Brand: MEDLINE

## (undated) DEVICE — BLADELESS OBTURATOR: Brand: WECK VISTA

## (undated) DEVICE — SOLUTION IRRIG 1000ML STRL H2O USP PLAS POUR BTL

## (undated) DEVICE — SYRINGE, LUER SLIP, STERILE, 60ML: Brand: MEDLINE

## (undated) DEVICE — SYRINGE MEDICAL 3ML CLEAR PLASTIC STANDARD NON CONTROL LUERLOCK TIP DISPOSABLE

## (undated) DEVICE — NEEDLE SYR 18GA L1.5IN RED PLAS HUB S STL BLNT FILL W/O

## (undated) DEVICE — SINGLE PORT MANIFOLD: Brand: NEPTUNE 2

## (undated) DEVICE — ENDOSCOPIC KIT 1.1+ OP4 CA DE 2 GWN AAMI LEVEL 3

## (undated) DEVICE — GLOVE SURG SZ 65 THK91MIL LTX FREE SYN POLYISOPRENE

## (undated) DEVICE — BLOCK BITE AD 60FR W/ VELC STRP ADDRESSES MOST PT AND

## (undated) DEVICE — YANKAUER,BULB TIP,W/O VENT,RIGID,STERILE: Brand: MEDLINE

## (undated) DEVICE — GAUZE,SPONGE,4"X4",12PLY,WOVEN,NS,LF: Brand: MEDLINE

## (undated) DEVICE — AIRSEAL 12 MM ACCESS PORT AND PALM GRIP OBTURATOR WITH BLADELESS OPTICAL TIP, 120 MM LENGTH: Brand: AIRSEAL

## (undated) DEVICE — GOWN,REINF,POLY,ECL,PP SLV,XL: Brand: MEDLINE

## (undated) DEVICE — APPLICATOR MEDICATED 26 CC SOLUTION HI LT ORNG CHLORAPREP

## (undated) DEVICE — OCCLUDER UTER DISP COLPO-PNEUMO

## (undated) DEVICE — PAD PT POS 36 IN SURGYPAD DISP

## (undated) DEVICE — AIRLIFE™ OXYGEN TUBING 7 FEET (2.1 M) CRUSH RESISTANT OXYGEN TUBING, VINYL TIPPED: Brand: AIRLIFE™

## (undated) DEVICE — 1LYRTR 16FR10ML 100%SILI SNAP: Brand: MEDLINE INDUSTRIES, INC.

## (undated) DEVICE — CONNECTOR TBNG OD5-7MM O2 END DISP

## (undated) DEVICE — JELLY LUBRICATING 10GM PREFIL SYR LUBE

## (undated) DEVICE — MASTISOL ADHESIVE LIQ 2/3ML

## (undated) DEVICE — SUTURE MONOCRYL + SZ 4-0 L27IN ABSRB UD L19MM PS-2 3/8 CIR MCP426H

## (undated) DEVICE — SEAL

## (undated) DEVICE — SOLUTION IV 1000ML LAC RINGERS PH 6.5 INJ USP VIAFLX PLAS

## (undated) DEVICE — STRIP,CLOSURE,WOUND,MEDI-STRIP,1/2X4: Brand: MEDLINE

## (undated) DEVICE — MOUTHPIECE ENDOSCP L CTRL OPN AND SIDE PORTS DISP

## (undated) DEVICE — TRI-LUMEN FILTERED TUBE SET WITH ACTIVATED CHARCOAL FILTER: Brand: AIRSEAL

## (undated) DEVICE — COLUMN DRAPE

## (undated) DEVICE — PORT HND ACC L120MM 12MM BLDELSS OPT TIP AIRSEAL

## (undated) DEVICE — TIP COVER ACCESSORY

## (undated) DEVICE — CANNULA NSL ORAL AD FOR CAPNOFLEX CO2 O2 AIRLFE